# Patient Record
Sex: FEMALE | Race: OTHER | HISPANIC OR LATINO | URBAN - METROPOLITAN AREA
[De-identification: names, ages, dates, MRNs, and addresses within clinical notes are randomized per-mention and may not be internally consistent; named-entity substitution may affect disease eponyms.]

---

## 2018-03-05 ENCOUNTER — EMERGENCY (EMERGENCY)
Facility: HOSPITAL | Age: 38
LOS: 1 days | Discharge: HOME | End: 2018-03-05
Attending: EMERGENCY MEDICINE | Admitting: EMERGENCY MEDICINE
Payer: COMMERCIAL

## 2018-03-05 VITALS
TEMPERATURE: 98 F | SYSTOLIC BLOOD PRESSURE: 112 MMHG | OXYGEN SATURATION: 100 % | RESPIRATION RATE: 18 BRPM | HEART RATE: 71 BPM | DIASTOLIC BLOOD PRESSURE: 65 MMHG

## 2018-03-05 VITALS — HEART RATE: 70 BPM | RESPIRATION RATE: 16 BRPM | OXYGEN SATURATION: 100 %

## 2018-03-05 DIAGNOSIS — M21.611 BUNION OF RIGHT FOOT: Chronic | ICD-10-CM

## 2018-03-05 LAB — S PYO AG SPEC QL IA: NEGATIVE — SIGNIFICANT CHANGE UP

## 2018-03-05 PROCEDURE — 87081 CULTURE SCREEN ONLY: CPT

## 2018-03-05 PROCEDURE — 99282 EMERGENCY DEPT VISIT SF MDM: CPT

## 2018-03-05 PROCEDURE — 87880 STREP A ASSAY W/OPTIC: CPT

## 2018-03-05 PROCEDURE — 99283 EMERGENCY DEPT VISIT LOW MDM: CPT

## 2018-03-05 NOTE — ED ADULT NURSE NOTE - OBJECTIVE STATEMENT
Patient with sore throat and hoarse voice, but no resp distress. Started yesterday, not better yet today.

## 2018-03-05 NOTE — ED PROVIDER NOTE - OBJECTIVE STATEMENT
38yo female who presenst with sore throat for 2 days. pt c/o pain with swallowing, no cough, +chills, no fever, pt took motrin with relief, no sick contacts, no drooling or change in voice 38yo female who presents with sore throat for 2 days. pt c/o pain with swallowing, no cough, +chills, no fever, pt took motrin with relief, no sick contacts, no drooling or change in voice

## 2018-03-07 LAB
CULTURE RESULTS: SIGNIFICANT CHANGE UP
SPECIMEN SOURCE: SIGNIFICANT CHANGE UP

## 2018-05-03 NOTE — ED ADULT NURSE NOTE - NSSISCREENINGQ4_ED_A_ED
No Detail Level: Zone General Sunscreen Counseling: I recommended a broad spectrum sunscreen with a SPF of 30 or higher.  I explained that SPF 30 sunscreens block approximately 97 percent of the sun's harmful rays.  Sunscreens should be applied at least 15 minutes prior to expected sun exposure and then every 2 hours after that as long as sun exposure continues. If swimming or exercising sunscreen should be reapplied every 45 minutes to an hour after getting wet or sweating.  One ounce, or the equivalent of a shot glass full of sunscreen, is adequate to protect the skin not covered by a bathing suit. I also recommended a lip balm with a sunscreen as well. Sun protective clothing can be used in lieu of sunscreen but must be worn the entire time you are exposed to the sun's rays.

## 2020-01-15 NOTE — ED ADULT NURSE NOTE - CINV DISCH TEACH PARTICIP
Please inform patient that she was positive for GBS. As we discussed at her visit she will need IV antibiotics during labor.    Thanks,  Dionne Cruz CNM
Pre-charting complete. Patient has no orders needing to be placed.    Declines flu   Tdap done 12/26/19  
S: Reports fetal movement. Denies vaginal bleeding, abnormal vaginal discharge, or leaking of fluid. She has no complaints today.  O: see flow sheet  A:   35w4d ,size equals dates, GBS screening  P:  1. GBS vaginal/rectal culture collected today  2. OB check in 2 weeks  3. Reviewed danger signs and warning signs and when and how to contact CNM.         
Veronica is a 28 year old year old female here for an OB check.  She is      .  Gestational Age: 35w4d.  Concerns today include: None    She reports fetal movement.  She denies bleeding.  She denies cramping.  She denies nausea.  She denies breast tenderness.    No UA needed per Dionne Nancy, CNM    Allergy or Sensitivity to PCN? No    Patient was advised of today's exam.  GBS swab was set out for doctor.   If your visit includes an exam today, would you like an assistant to be present in the room during that time?No    [employer] application verified/signed up YES.   Pt informed that [employer] is loaded with  provider- approved content, customized resources, and weekly tasks and tips    (optimized scheduling) [employer] pledge card signed and more information about [employer] given to patient NO      
Patient

## 2020-11-02 ENCOUNTER — EMERGENCY (EMERGENCY)
Facility: HOSPITAL | Age: 40
LOS: 1 days | Discharge: HOME | End: 2020-11-02
Attending: EMERGENCY MEDICINE | Admitting: EMERGENCY MEDICINE
Payer: MEDICAID

## 2020-11-02 VITALS
HEART RATE: 70 BPM | SYSTOLIC BLOOD PRESSURE: 110 MMHG | RESPIRATION RATE: 16 BRPM | HEIGHT: 63 IN | TEMPERATURE: 97 F | DIASTOLIC BLOOD PRESSURE: 70 MMHG | OXYGEN SATURATION: 99 % | WEIGHT: 162.04 LBS

## 2020-11-02 DIAGNOSIS — M21.611 BUNION OF RIGHT FOOT: Chronic | ICD-10-CM

## 2020-11-02 PROCEDURE — 99283 EMERGENCY DEPT VISIT LOW MDM: CPT | Mod: 25

## 2020-11-02 PROCEDURE — 99283 EMERGENCY DEPT VISIT LOW MDM: CPT

## 2020-11-02 PROCEDURE — 96372 THER/PROPH/DIAG INJ SC/IM: CPT

## 2020-11-02 RX ORDER — CYCLOBENZAPRINE HYDROCHLORIDE 10 MG/1
1 TABLET, FILM COATED ORAL
Qty: 15 | Refills: 0
Start: 2020-11-02 | End: 2020-11-06

## 2020-11-02 RX ORDER — SERTRALINE 25 MG/1
75 TABLET, FILM COATED ORAL
Qty: 0 | Refills: 0 | DISCHARGE

## 2020-11-02 RX ORDER — KETOROLAC TROMETHAMINE 30 MG/ML
30 SYRINGE (ML) INJECTION ONCE
Refills: 0 | Status: DISCONTINUED | OUTPATIENT
Start: 2020-11-02 | End: 2020-11-02

## 2020-11-02 RX ORDER — CYCLOBENZAPRINE HYDROCHLORIDE 10 MG/1
10 TABLET, FILM COATED ORAL ONCE
Refills: 0 | Status: COMPLETED | OUTPATIENT
Start: 2020-11-02 | End: 2020-11-02

## 2020-11-02 RX ADMIN — CYCLOBENZAPRINE HYDROCHLORIDE 10 MILLIGRAM(S): 10 TABLET, FILM COATED ORAL at 21:47

## 2020-11-02 RX ADMIN — Medication 30 MILLIGRAM(S): at 21:48

## 2020-11-02 RX ADMIN — Medication 30 MILLIGRAM(S): at 22:21

## 2020-11-02 NOTE — ED PROVIDER NOTE - OBJECTIVE STATEMENT
39 yo female no pmhx c/o right lower back/buttock pain x 3 hours, radiating down her thigh, moderate pain, took motrin 400mg prior to arrival, worse with trunk twisting and standing.  States pain happened when trying to reach for something on the ground at home.  NO fever/chills.  NO urinary/bowel changes.

## 2020-11-02 NOTE — ED ADULT NURSE NOTE - NSIMPLEMENTINTERV_GEN_ALL_ED
Implemented All Universal Safety Interventions:  [place] to call system. Call bell, personal items and telephone within reach. Instruct patient to call for assistance. Room bathroom lighting operational. Non-slip footwear when patient is off stretcher. Physically safe environment: no spills, clutter or unnecessary equipment. Stretcher in lowest position, wheels locked, appropriate side rails in place.

## 2020-11-02 NOTE — ED ADULT NURSE NOTE - OBJECTIVE STATEMENT
Patient with history of anxiety (zoloft) presents via wheelchair from triage with c/o R lower back pain s/p playing with her daughter about three hours ago.  Took motrin 400mg to no relief.  Pain radiates down the R leg.  No history of sciatica or previous back issues.  No hematuria, numbness or tingling.  Difficulty bearing weight but states a little relief ambulating.

## 2020-11-02 NOTE — ED PROVIDER NOTE - NSFOLLOWUPINSTRUCTIONS_ED_ALL_ED_FT
1) Follow-up with your Primary Medical Doctor and Dr. Montelongo. Call today / next business day for prompt follow-up.  2) Return to Emergency room for any worsening or persistent pain, weakness, fever, or any other concerning symptoms.  3) See attached instruction sheets for additional information, including information regarding signs and symptoms to look out for, reasons to seek immediate care and other important instructions.  4) Take flexeril 10mg every 8 hours as needed.  Do not drive  5) Motrin 600mg every 6 hours as needed for pain.  Take with food.  6) Over the counter Salonpas 4% lidocaine patch once a day      WHAT YOU NEED TO KNOW:    Sciatica is a condition that causes pain along your sciatic nerve. The sciatic nerve runs from your spine through both sides of your buttocks. It then runs down the back of your thigh, into your lower leg and foot. Your sciatic nerve may be compressed, inflamed, irritated, or stretched.    DISCHARGE INSTRUCTIONS:    Medicines:   •NSAIDs: These medicines decrease swelling and pain. NSAIDs are available without a doctor's order. Ask your healthcare provider which medicine is right for you. Ask how much to take and when to take it. Take as directed. NSAIDs can cause stomach bleeding or kidney problems if not taken correctly.      •Acetaminophen: This medicine decreases pain. Acetaminophen is available without a doctor's order. Ask how much to take and when to take it. Follow directions. Acetaminophen can cause liver damage if not taken correctly.      •Muscle relaxers help decrease pain and muscle spasms.      •Take your medicine as directed. Contact your healthcare provider if you think your medicine is not helping or if you have side effects. Tell him of her if you are allergic to any medicine. Keep a list of the medicines, vitamins, and herbs you take. Include the amounts, and when and why you take them. Bring the list or the pill bottles to follow-up visits. Carry your medicine list with you in case of an emergency.      Follow up with your healthcare provider as directed: Write down your questions so you remember to ask them during your visits.     Manage your symptoms:   •Activity: Decrease your activity. Do not lift heavy objects or twist your back for at least 6 weeks. Slowly return to your usual activity.      •Ice: Ice helps decrease swelling and pain. Ice may also help prevent tissue damage. Use an ice pack, or put crushed ice in a plastic bag. Cover it with a towel and place it on your low back or leg for 15 to 20 minutes every hour or as directed.      •Heat: Heat helps decrease pain and muscle spasms. Apply heat on the area for 20 to 30 minutes every 2 hours for as many days as directed.       •Physical therapy: You may need to see physical therapist to teach you exercises to help improve movement and strength, and to decrease pain. An occupational therapist teaches you skills to help with your daily activities.       •Use assistive devices if directed: You may need to wear back support, such as a back brace. You may need crutches, a cane, or a walker to decrease stress on your lower back and leg muscles. Ask your healthcare provider for more information about assistive devices and how to use them correctly.      Self-care:   •Avoid pressure on your back and legs: Do not lift heavy objects, or stand or sit for long periods of time.      •Lift objects safely: Keep your back straight and bend your knees when you  an object. Do not bend or twist your back when you lift.      •Maintain a healthy weight: Ask your healthcare provider how much you should weigh. Ask him to help you create a weight loss plan if you are overweight.       •Exercise: Ask your healthcare provider about the best stretching, warmup, and exercise plan for you.       Contact your healthcare provider if:   •You have pain in your lower back at night or when resting.      •You have pain in your lower back with numbness below the knee.      •You have weakness in one leg only.      •You have questions or concerns about your condition or care.      Return to the emergency department if:   •You have trouble holding back your urine or bowel movements.      •You have weakness in both legs.      •You have numbness in your groin or buttocks.

## 2020-11-02 NOTE — ED PROVIDER NOTE - PHYSICAL EXAMINATION
Gen: Alert, NAD  Head/eyes: NC/AT, PERRL  ENT: airway patent  Neck: supple, no tenderness/meningismus/JVD, Trachea midline  Pulm/lung: Bilateral clear BS, normal resp effort, no wheeze/stridor/retractions  CV/heart: RRR, no M/R/G, +2 dist pulses (radial, pedal DP/PT, popliteal)  GI/Abd: soft, NT/ND, +BS, no guarding/rebound tenderness  Musculoskeletal: no edema/erythema/cyanosis, FROM in all extremities, no C/T/L spine ttp, +ttp right lower lumar paraspinal and right upper buttock, reproducible, no CVAT b/l  Skin: no rash, no vesicles, no petechaie, no ecchymosis, no swelling  Neuro: AAOx3, CN 2-12 intact, normal sensation, 5/5 motor strength in all extremities, normal gait

## 2020-11-02 NOTE — ED ADULT NURSE REASSESSMENT NOTE - NS ED NURSE REASSESS COMMENT FT1
Patient verbalizes improvement in pain level and was able to stand up independently with little pain.  Pending disposition from the ER.  Patient currently on her cell phone with  at bedside.  NAD.  Safety maintained.

## 2020-11-02 NOTE — ED PROVIDER NOTE - PATIENT PORTAL LINK FT
You can access the FollowMyHealth Patient Portal offered by [facility] by registering at the following website: [email or web address]. By joining [employer]’s FollowMyHealth portal, you will also be able to view your health information using other applications (apps) compatible with our system.

## 2020-11-03 PROBLEM — R42 DIZZINESS AND GIDDINESS: Chronic | Status: ACTIVE | Noted: 2018-03-05

## 2021-04-01 ENCOUNTER — RESULT REVIEW (OUTPATIENT)
Age: 41
End: 2021-04-01

## 2021-04-01 ENCOUNTER — APPOINTMENT (OUTPATIENT)
Dept: ULTRASOUND IMAGING | Facility: IMAGING CENTER | Age: 41
End: 2021-04-01
Payer: MEDICAID

## 2021-04-01 ENCOUNTER — OUTPATIENT (OUTPATIENT)
Dept: OUTPATIENT SERVICES | Facility: HOSPITAL | Age: 41
LOS: 1 days | End: 2021-04-01
Payer: MEDICAID

## 2021-04-01 DIAGNOSIS — M21.611 BUNION OF RIGHT FOOT: Chronic | ICD-10-CM

## 2021-04-01 DIAGNOSIS — R59.0 LOCALIZED ENLARGED LYMPH NODES: ICD-10-CM

## 2021-04-01 DIAGNOSIS — Z00.8 ENCOUNTER FOR OTHER GENERAL EXAMINATION: ICD-10-CM

## 2021-04-01 PROCEDURE — 88305 TISSUE EXAM BY PATHOLOGIST: CPT

## 2021-04-01 PROCEDURE — 10005 FNA BX W/US GDN 1ST LES: CPT

## 2021-04-01 PROCEDURE — 88189 FLOWCYTOMETRY/READ 16 & >: CPT

## 2021-04-01 PROCEDURE — 88305 TISSUE EXAM BY PATHOLOGIST: CPT | Mod: 26

## 2021-04-01 PROCEDURE — 88184 FLOWCYTOMETRY/ TC 1 MARKER: CPT

## 2021-04-01 PROCEDURE — 88173 CYTOPATH EVAL FNA REPORT: CPT | Mod: 26

## 2021-04-01 PROCEDURE — 88173 CYTOPATH EVAL FNA REPORT: CPT

## 2021-04-01 PROCEDURE — 87205 SMEAR GRAM STAIN: CPT

## 2021-04-01 PROCEDURE — 88185 FLOWCYTOMETRY/TC ADD-ON: CPT

## 2021-04-01 PROCEDURE — 88172 CYTP DX EVAL FNA 1ST EA SITE: CPT

## 2021-04-20 ENCOUNTER — LABORATORY RESULT (OUTPATIENT)
Age: 41
End: 2021-04-20

## 2021-04-20 ENCOUNTER — APPOINTMENT (OUTPATIENT)
Dept: SURGERY | Facility: CLINIC | Age: 41
End: 2021-04-20
Payer: MEDICAID

## 2021-04-20 VITALS
BODY MASS INDEX: 28.88 KG/M2 | HEIGHT: 63 IN | WEIGHT: 163 LBS | SYSTOLIC BLOOD PRESSURE: 109 MMHG | DIASTOLIC BLOOD PRESSURE: 70 MMHG | HEART RATE: 73 BPM

## 2021-04-20 DIAGNOSIS — Z78.9 OTHER SPECIFIED HEALTH STATUS: ICD-10-CM

## 2021-04-20 DIAGNOSIS — Z86.59 PERSONAL HISTORY OF OTHER MENTAL AND BEHAVIORAL DISORDERS: ICD-10-CM

## 2021-04-20 DIAGNOSIS — G40.909 EPILEPSY, UNSPECIFIED, NOT INTRACTABLE, W/OUT STATUS EPILEPTICUS: ICD-10-CM

## 2021-04-20 PROCEDURE — 99072 ADDL SUPL MATRL&STAF TM PHE: CPT

## 2021-04-20 PROCEDURE — 99204 OFFICE O/P NEW MOD 45 MIN: CPT | Mod: 25

## 2021-04-20 PROCEDURE — 36415 COLL VENOUS BLD VENIPUNCTURE: CPT

## 2021-04-20 RX ORDER — SERTRALINE HYDROCHLORIDE 25 MG/1
TABLET, FILM COATED ORAL
Refills: 0 | Status: ACTIVE | COMMUNITY

## 2021-04-20 NOTE — HISTORY OF PRESENT ILLNESS
[de-identified] : Pt c/o anterior neck mass .  had biopsies reported as benign.   had lymph node biopsied which shows metastatic papillary thyroid malignancy.   denies dysphagia, hoarseness, SOB or RT exposure\par Sonogram:   Isthmus 2.2 cm nodule, FNA (CBL) benign,  right  1.1 cm and  1.5 cm nodules, FNA (CBL) AUS;  left 1.3 cm nodule.  Right lateral neck 1.2 cm node,  FNA  Positive for metastatic papillary thyroid malignancy, Right level 1  1.0 cm, level 2   2.9 cm, Left level 3  1.6 and 1.2 cm, level 2  1.4 cm and 1.6 cm, level 5 Lymph node  1.3 cm\par normal TFTs\par I have reviewed all old and new data and available images.\par

## 2021-04-20 NOTE — ASSESSMENT
[FreeTextEntry1] : recommended total thyroidectomy with right modified radical neck dissection and postop MCKEON. risks, benefits and alternatives discussed at length.  I have discussed with the patient the anatomy of the area, the pathophysiology of the disease process and the rationale for surgery.  The attendant risks, possible complications and expected postoperative course have been discussed in detail.  I have given the patient the opportunity to ask questions, and all questions have been answered to the patient's satisfaction, and she wishes to proceed with the planned procedure.  to be scheduled at [facility]

## 2021-04-20 NOTE — PHYSICAL EXAM
[de-identified] : firm 1 cm right thyroid nodule, well circumscribed and mobile [Laryngoscopy Performed] : laryngoscopy was performed, see procedure section for findings [Midline] : located in midline position [Normal] : orientation to person, place, and time: normal [de-identified] : right neck node not palpable [de-identified] : indirect  laryngoscopy shows normal vocal cord mobility bilaterally with no lesions noted

## 2021-04-20 NOTE — CONSULT LETTER
[Dear  ___] : Dear  [unfilled], [Consult Letter:] : I had the pleasure of evaluating your patient, [unfilled]. [Please see my note below.] : Please see my note below. [Consult Closing:] : Thank you very much for allowing me to participate in the care of this patient.  If you have any questions, please do not hesitate to contact me. [Sincerely,] : Sincerely, [FreeTextEntry2] : Dr. Olegario Izaguirre, Dr. Nathalie Amoshikeenan [FreeTextEntry3] : Cecil Grider MD, FACS\par System Director, Endocrine Surgery\par [facility]\par Assistant Clinical Professor of Surgery\par [employer] [facility]  [DrZahida  ___] : Dr. VO

## 2021-04-21 LAB
T3 SERPL-MCNC: 87 NG/DL
T4 FREE SERPL-MCNC: 1 NG/DL
THYROGLOB AB SERPL-ACNC: 1897 IU/ML
THYROGLOB SERPL-MCNC: 29.3 NG/ML
TSH SERPL-ACNC: 3.05 UIU/ML

## 2021-04-23 ENCOUNTER — NON-APPOINTMENT (OUTPATIENT)
Age: 41
End: 2021-04-23

## 2021-04-26 ENCOUNTER — RESULT REVIEW (OUTPATIENT)
Age: 41
End: 2021-04-26

## 2021-05-01 ENCOUNTER — OUTPATIENT (OUTPATIENT)
Dept: OUTPATIENT SERVICES | Facility: HOSPITAL | Age: 41
LOS: 1 days | End: 2021-05-01
Payer: MEDICAID

## 2021-05-01 DIAGNOSIS — M21.611 BUNION OF RIGHT FOOT: Chronic | ICD-10-CM

## 2021-05-01 PROCEDURE — G9005: CPT

## 2021-05-12 ENCOUNTER — OUTPATIENT (OUTPATIENT)
Dept: OUTPATIENT SERVICES | Facility: HOSPITAL | Age: 41
LOS: 1 days | End: 2021-05-12

## 2021-05-12 VITALS
WEIGHT: 166.01 LBS | SYSTOLIC BLOOD PRESSURE: 124 MMHG | OXYGEN SATURATION: 98 % | HEART RATE: 74 BPM | TEMPERATURE: 99 F | DIASTOLIC BLOOD PRESSURE: 66 MMHG | RESPIRATION RATE: 16 BRPM | HEIGHT: 63 IN

## 2021-05-12 DIAGNOSIS — Z98.890 OTHER SPECIFIED POSTPROCEDURAL STATES: Chronic | ICD-10-CM

## 2021-05-12 DIAGNOSIS — M21.611 BUNION OF RIGHT FOOT: Chronic | ICD-10-CM

## 2021-05-12 DIAGNOSIS — C73 MALIGNANT NEOPLASM OF THYROID GLAND: ICD-10-CM

## 2021-05-12 LAB
ANION GAP SERPL CALC-SCNC: 14 MMOL/L — SIGNIFICANT CHANGE UP (ref 7–14)
BLD GP AB SCN SERPL QL: NEGATIVE — SIGNIFICANT CHANGE UP
BUN SERPL-MCNC: 18 MG/DL — SIGNIFICANT CHANGE UP (ref 7–23)
CALCIUM SERPL-MCNC: 8.9 MG/DL — SIGNIFICANT CHANGE UP (ref 8.4–10.5)
CHLORIDE SERPL-SCNC: 102 MMOL/L — SIGNIFICANT CHANGE UP (ref 98–107)
CO2 SERPL-SCNC: 20 MMOL/L — LOW (ref 22–31)
CREAT SERPL-MCNC: 0.79 MG/DL — SIGNIFICANT CHANGE UP (ref 0.5–1.3)
GLUCOSE SERPL-MCNC: 78 MG/DL — SIGNIFICANT CHANGE UP (ref 70–99)
HCG UR QL: NEGATIVE — SIGNIFICANT CHANGE UP
HCT VFR BLD CALC: 38.5 % — SIGNIFICANT CHANGE UP (ref 34.5–45)
HGB BLD-MCNC: 12.5 G/DL — SIGNIFICANT CHANGE UP (ref 11.5–15.5)
MCHC RBC-ENTMCNC: 27.7 PG — SIGNIFICANT CHANGE UP (ref 27–34)
MCHC RBC-ENTMCNC: 32.5 GM/DL — SIGNIFICANT CHANGE UP (ref 32–36)
MCV RBC AUTO: 85.4 FL — SIGNIFICANT CHANGE UP (ref 80–100)
NRBC # BLD: 0 /100 WBCS — SIGNIFICANT CHANGE UP
NRBC # FLD: 0 K/UL — SIGNIFICANT CHANGE UP
PLATELET # BLD AUTO: 237 K/UL — SIGNIFICANT CHANGE UP (ref 150–400)
POTASSIUM SERPL-MCNC: 4.1 MMOL/L — SIGNIFICANT CHANGE UP (ref 3.5–5.3)
POTASSIUM SERPL-SCNC: 4.1 MMOL/L — SIGNIFICANT CHANGE UP (ref 3.5–5.3)
RBC # BLD: 4.51 M/UL — SIGNIFICANT CHANGE UP (ref 3.8–5.2)
RBC # FLD: 13.2 % — SIGNIFICANT CHANGE UP (ref 10.3–14.5)
RH IG SCN BLD-IMP: POSITIVE — SIGNIFICANT CHANGE UP
SODIUM SERPL-SCNC: 136 MMOL/L — SIGNIFICANT CHANGE UP (ref 135–145)
WBC # BLD: 9.12 K/UL — SIGNIFICANT CHANGE UP (ref 3.8–10.5)
WBC # FLD AUTO: 9.12 K/UL — SIGNIFICANT CHANGE UP (ref 3.8–10.5)

## 2021-05-12 RX ORDER — SODIUM CHLORIDE 9 MG/ML
3 INJECTION INTRAMUSCULAR; INTRAVENOUS; SUBCUTANEOUS ONCE
Refills: 0 | Status: DISCONTINUED | OUTPATIENT
Start: 2021-05-24 | End: 2021-05-24

## 2021-05-12 RX ORDER — SERTRALINE 25 MG/1
1 TABLET, FILM COATED ORAL
Qty: 0 | Refills: 0 | DISCHARGE

## 2021-05-12 RX ORDER — SODIUM CHLORIDE 9 MG/ML
1000 INJECTION, SOLUTION INTRAVENOUS
Refills: 0 | Status: DISCONTINUED | OUTPATIENT
Start: 2021-05-24 | End: 2021-05-24

## 2021-05-12 NOTE — H&P PST ADULT - NSICDXPASTMEDICALHX_GEN_ALL_CORE_FT
PAST MEDICAL HISTORY:  Anxiety     Bilateral bunions     History of seizures last seizure in 2001, has been off medication for several years    Vertigo

## 2021-05-12 NOTE — H&P PST ADULT - NSICDXPROBLEM_GEN_ALL_CORE_FT
PROBLEM DIAGNOSES  Problem: Malignant neoplasm of thyroid gland  Assessment and Plan: Pt scheduled for surgery on 5/24/2021.  Pre-op instructions provided. Pt verbalized understanding.   Pepcid provided for GI prophylaxis.   Pt given detailed verbal and written instructions on chlorhexidine wash. Pt verbalized understanding with teachback.   Pt states she is already scheduled for preop COVID testing.   Dr Grider notified via email that pt is [facility] and does not want blood transfusion.

## 2021-05-12 NOTE — H&P PST ADULT - HISTORY OF PRESENT ILLNESS
40 year old female with mass to neck which she first noticed around 11/2020. Pt states she had biopsy which revealed malignancy. Pt presents today for presurgical evaluation for ... 40 year old female with mass to neck which she first noticed around 11/2020. Pt states she had biopsy which revealed malignancy. Pt presents today for presurgical evaluation for Total Thyroidectomy with Right Neck Dissection.

## 2021-05-20 DIAGNOSIS — Z01.818 ENCOUNTER FOR OTHER PREPROCEDURAL EXAMINATION: ICD-10-CM

## 2021-05-21 ENCOUNTER — APPOINTMENT (OUTPATIENT)
Dept: DISASTER EMERGENCY | Facility: CLINIC | Age: 41
End: 2021-05-21

## 2021-05-21 PROBLEM — M21.611 BUNION OF RIGHT FOOT: Chronic | Status: ACTIVE | Noted: 2021-05-12

## 2021-05-21 PROBLEM — Z87.898 PERSONAL HISTORY OF OTHER SPECIFIED CONDITIONS: Chronic | Status: ACTIVE | Noted: 2021-05-12

## 2021-05-21 PROBLEM — F41.9 ANXIETY DISORDER, UNSPECIFIED: Chronic | Status: ACTIVE | Noted: 2021-05-12

## 2021-05-22 LAB — SARS-COV-2 N GENE NPH QL NAA+PROBE: NOT DETECTED

## 2021-05-22 NOTE — ASU PATIENT PROFILE, ADULT - PMH
Anxiety    Bilateral bunions    History of seizures  last seizure in 2001, has been off medication for several years  Vertigo

## 2021-05-23 ENCOUNTER — TRANSCRIPTION ENCOUNTER (OUTPATIENT)
Age: 41
End: 2021-05-23

## 2021-05-24 ENCOUNTER — INPATIENT (INPATIENT)
Facility: HOSPITAL | Age: 41
LOS: 0 days | Discharge: HOME | End: 2021-05-25
Attending: SPECIALIST | Admitting: SPECIALIST
Payer: MEDICAID

## 2021-05-24 ENCOUNTER — APPOINTMENT (OUTPATIENT)
Dept: SURGERY | Facility: HOSPITAL | Age: 41
End: 2021-05-24

## 2021-05-24 ENCOUNTER — RESULT REVIEW (OUTPATIENT)
Age: 41
End: 2021-05-24

## 2021-05-24 ENCOUNTER — TRANSCRIPTION ENCOUNTER (OUTPATIENT)
Age: 41
End: 2021-05-24

## 2021-05-24 VITALS
WEIGHT: 166.01 LBS | TEMPERATURE: 98 F | HEART RATE: 59 BPM | HEIGHT: 63 IN | OXYGEN SATURATION: 100 % | SYSTOLIC BLOOD PRESSURE: 112 MMHG | RESPIRATION RATE: 16 BRPM | DIASTOLIC BLOOD PRESSURE: 74 MMHG

## 2021-05-24 DIAGNOSIS — C73 MALIGNANT NEOPLASM OF THYROID GLAND: ICD-10-CM

## 2021-05-24 DIAGNOSIS — Z98.890 OTHER SPECIFIED POSTPROCEDURAL STATES: Chronic | ICD-10-CM

## 2021-05-24 LAB
CALCIUM SERPL-MCNC: 8.5 MG/DL — SIGNIFICANT CHANGE UP (ref 8.4–10.5)
HCG UR QL: NEGATIVE — SIGNIFICANT CHANGE UP

## 2021-05-24 PROCEDURE — 88309 TISSUE EXAM BY PATHOLOGIST: CPT | Mod: 26

## 2021-05-24 PROCEDURE — 88307 TISSUE EXAM BY PATHOLOGIST: CPT | Mod: 26

## 2021-05-24 RX ORDER — SERTRALINE 25 MG/1
1 TABLET, FILM COATED ORAL
Qty: 0 | Refills: 0 | DISCHARGE

## 2021-05-24 RX ORDER — ACETAMINOPHEN 500 MG
2 TABLET ORAL
Qty: 0 | Refills: 0 | DISCHARGE
Start: 2021-05-24

## 2021-05-24 RX ORDER — CALCIUM CARBONATE 500(1250)
1 TABLET ORAL
Qty: 0 | Refills: 0 | DISCHARGE
Start: 2021-05-24

## 2021-05-24 RX ORDER — LEVOTHYROXINE SODIUM 125 MCG
1 TABLET ORAL
Qty: 0 | Refills: 0 | DISCHARGE
Start: 2021-05-24

## 2021-05-24 RX ORDER — ONDANSETRON 8 MG/1
4 TABLET, FILM COATED ORAL ONCE
Refills: 0 | Status: DISCONTINUED | OUTPATIENT
Start: 2021-05-24 | End: 2021-05-24

## 2021-05-24 RX ORDER — SERTRALINE 25 MG/1
100 TABLET, FILM COATED ORAL DAILY
Refills: 0 | Status: DISCONTINUED | OUTPATIENT
Start: 2021-05-24 | End: 2021-05-25

## 2021-05-24 RX ORDER — CALCIUM CARBONATE 500(1250)
1 TABLET ORAL EVERY 6 HOURS
Refills: 0 | Status: DISCONTINUED | OUTPATIENT
Start: 2021-05-24 | End: 2021-05-25

## 2021-05-24 RX ORDER — FENTANYL CITRATE 50 UG/ML
25 INJECTION INTRAVENOUS
Refills: 0 | Status: DISCONTINUED | OUTPATIENT
Start: 2021-05-24 | End: 2021-05-24

## 2021-05-24 RX ORDER — OXYCODONE HYDROCHLORIDE 5 MG/1
10 TABLET ORAL ONCE
Refills: 0 | Status: DISCONTINUED | OUTPATIENT
Start: 2021-05-24 | End: 2021-05-24

## 2021-05-24 RX ORDER — SODIUM CHLORIDE 9 MG/ML
1000 INJECTION, SOLUTION INTRAVENOUS
Refills: 0 | Status: DISCONTINUED | OUTPATIENT
Start: 2021-05-24 | End: 2021-05-25

## 2021-05-24 RX ORDER — CALCIUM GLUCONATE 100 MG/ML
1 VIAL (ML) INTRAVENOUS ONCE
Refills: 0 | Status: COMPLETED | OUTPATIENT
Start: 2021-05-24 | End: 2021-05-24

## 2021-05-24 RX ORDER — OXYCODONE AND ACETAMINOPHEN 5; 325 MG/1; MG/1
1 TABLET ORAL EVERY 6 HOURS
Refills: 0 | Status: DISCONTINUED | OUTPATIENT
Start: 2021-05-24 | End: 2021-05-25

## 2021-05-24 RX ORDER — ACETAMINOPHEN 500 MG
650 TABLET ORAL EVERY 6 HOURS
Refills: 0 | Status: DISCONTINUED | OUTPATIENT
Start: 2021-05-24 | End: 2021-05-25

## 2021-05-24 RX ORDER — LEVOTHYROXINE SODIUM 125 MCG
100 TABLET ORAL DAILY
Refills: 0 | Status: DISCONTINUED | OUTPATIENT
Start: 2021-05-24 | End: 2021-05-25

## 2021-05-24 RX ORDER — LEVOTHYROXINE SODIUM 125 MCG
1 TABLET ORAL
Qty: 30 | Refills: 2
Start: 2021-05-24 | End: 2021-08-21

## 2021-05-24 RX ORDER — FENTANYL CITRATE 50 UG/ML
50 INJECTION INTRAVENOUS ONCE
Refills: 0 | Status: DISCONTINUED | OUTPATIENT
Start: 2021-05-24 | End: 2021-05-24

## 2021-05-24 RX ORDER — BENZOCAINE AND MENTHOL 5; 1 G/100ML; G/100ML
1 LIQUID ORAL
Refills: 0 | Status: DISCONTINUED | OUTPATIENT
Start: 2021-05-24 | End: 2021-05-25

## 2021-05-24 RX ORDER — OXYCODONE HYDROCHLORIDE 5 MG/1
5 TABLET ORAL ONCE
Refills: 0 | Status: DISCONTINUED | OUTPATIENT
Start: 2021-05-24 | End: 2021-05-24

## 2021-05-24 RX ADMIN — FENTANYL CITRATE 25 MICROGRAM(S): 50 INJECTION INTRAVENOUS at 21:05

## 2021-05-24 RX ADMIN — FENTANYL CITRATE 50 MICROGRAM(S): 50 INJECTION INTRAVENOUS at 18:10

## 2021-05-24 RX ADMIN — Medication 100 GRAM(S): at 17:45

## 2021-05-24 RX ADMIN — Medication 1 TABLET(S): at 19:05

## 2021-05-24 RX ADMIN — FENTANYL CITRATE 50 MICROGRAM(S): 50 INJECTION INTRAVENOUS at 18:20

## 2021-05-24 RX ADMIN — FENTANYL CITRATE 25 MICROGRAM(S): 50 INJECTION INTRAVENOUS at 21:20

## 2021-05-24 RX ADMIN — Medication 1 TABLET(S): at 23:54

## 2021-05-24 NOTE — DISCHARGE NOTE PROVIDER - NSDCCPTREATMENT_GEN_ALL_CORE_FT
PRINCIPAL PROCEDURE  Procedure: Total thyroidectomy with radical neck dissection  Findings and Treatment:       SECONDARY PROCEDURE  Procedure: Parathyroid autotransplantation  Findings and Treatment:

## 2021-05-24 NOTE — DISCHARGE NOTE PROVIDER - CARE PROVIDER_API CALL
Cecil Grider)  Plastic Surgery  [address] [place] [address] [address]  [place] [address]  Phone: [phone]  Fax: [phone]  Follow Up Time:

## 2021-05-24 NOTE — DISCHARGE NOTE PROVIDER - HOSPITAL COURSE
05/24/2021 - total thyroidectomy, right modified radical neck dissection, parathyroid autotransplantation

## 2021-05-24 NOTE — DISCHARGE NOTE PROVIDER - NSDCCPCAREPLAN_GEN_ALL_CORE_FT
PRINCIPAL DISCHARGE DIAGNOSIS  Diagnosis: Thyroid malignant neoplasm  Assessment and Plan of Treatment:

## 2021-05-24 NOTE — CHART NOTE - NSCHARTNOTEFT_GEN_A_CORE
PROCEDURE: Total thyroidectomy w right modified radical neck dissection, parathyroid autoimplantation    SUBJECTIVE:   Pt seen and examined at bedside. Pt s/p Total thyroidectomy w right modified radical neck dissection, parathyroid autoimplantation. Pt tolerated the procedure well. Pt laying in bed comfortably. No nausea, vomiting, chest pain, SOB, fever, chills.        Vital Signs Last 24 Hrs  T(C): 36.8 (24 May 2021 17:40), Max: 36.8 (24 May 2021 17:40)  T(F): 98.2 (24 May 2021 17:40), Max: 98.2 (24 May 2021 17:40)  HR: 71 (24 May 2021 20:00) (59 - 75)  BP: 123/74 (24 May 2021 20:00) (105/86 - 127/73)  BP(mean): 85 (24 May 2021 20:00) (81 - 90)  RR: 19 (24 May 2021 20:00) (14 - 19)  SpO2: 94% (24 May 2021 20:00) (94% - 100%)      PHYSICAL EXAM:  Constitutional: NAD, laying in bed  Neuro: AAOx3  Respiratory: breathing comfortably  HEENT: neck soft, no hematoma, dressing c/d/i, KEITH w/ SS      I&O's Summary    24 May 2021 07:01  -  24 May 2021 21:59  --------------------------------------------------------  IN: 150 mL / OUT: 25 mL / NET: 125 mL      I&O's Detail    24 May 2021 07:01  -  24 May 2021 21:59  --------------------------------------------------------  IN:    Lactated Ringers: 150 mL  Total IN: 150 mL    OUT:    Bulb (mL): 25 mL  Total OUT: 25 mL    Total NET: 125 mL          MEDICATIONS  (STANDING):  calcium carbonate    500 mG (Tums) Chewable 1 Tablet(s) Chew every 6 hours  lactated ringers. 1000 milliLiter(s) (50 mL/Hr) IV Continuous <Continuous>  levothyroxine 100 MICROGram(s) Oral daily  sertraline 100 milliGRAM(s) Oral daily    MEDICATIONS  (PRN):  acetaminophen   Tablet .. 650 milliGRAM(s) Oral every 6 hours PRN Moderate Pain (4 - 6)  benzocaine 15 mG/menthol 3.6 mG (Sugar-Free) Lozenge 1 Lozenge Oral every 2 hours PRN Sore Throat  fentaNYL    Injectable 25 MICROGram(s) IV Push every 5 minutes PRN Moderate Pain (4 - 6)  ondansetron Injectable 4 milliGRAM(s) IV Push once PRN Nausea and/or Vomiting  oxycodone    5 mG/acetaminophen 325 mG 1 Tablet(s) Oral every 6 hours PRN Severe Pain (7 - 10)  oxyCODONE    IR 5 milliGRAM(s) Oral once PRN Moderate Pain (4 - 6)  oxyCODONE    IR 10 milliGRAM(s) Oral once PRN Severe Pain (7 - 10)      LABS:              Pt is a 39yo F s/p Total thyroidectomy w right modified radical neck dissection, parathyroid autoimplantation    Plan:  - regular diet, as tolerated  - pain control  - f/u Ca levels  - no AM labs  - no DVT ppx  - f/u KEITH output, dc in AM  - dc tomorrow    C Team Surgery, [id number]

## 2021-05-24 NOTE — BRIEF OPERATIVE NOTE - NSICDXBRIEFPROCEDURE_GEN_ALL_CORE_FT
PROCEDURES:  Total thyroidectomy with modified radical neck dissection 24-May-2021 17:44:16  Kostas Connolly

## 2021-05-24 NOTE — DISCHARGE NOTE PROVIDER - NSDCMRMEDTOKEN_GEN_ALL_CORE_FT
acetaminophen 325 mg oral tablet: 2 tab(s) orally every 6 hours, As needed, Moderate Pain (4 - 6)  calcium carbonate 500 mg (200 mg elemental calcium) oral tablet, chewable: 1 tab(s) orally every 6 hours  levothyroxine 100 mcg (0.1 mg) oral tablet: 1 tab(s) orally once a day  sertraline 100 mg oral tablet: 1 tab(s) orally once a day   acetaminophen 325 mg oral tablet: 2 tab(s) orally every 6 hours, As needed, Moderate Pain (4 - 6)  calcium carbonate 500 mg (200 mg elemental calcium) oral tablet, chewable: 1 tab(s) orally every 6 hours  Levoxyl 100 mcg (0.1 mg) oral tablet: 1 tab(s) orally once a day   sertraline 100 mg oral tablet: 1 tab(s) orally once a day

## 2021-05-25 ENCOUNTER — TRANSCRIPTION ENCOUNTER (OUTPATIENT)
Age: 41
End: 2021-05-25

## 2021-05-25 VITALS
RESPIRATION RATE: 18 BRPM | HEART RATE: 86 BPM | TEMPERATURE: 98 F | DIASTOLIC BLOOD PRESSURE: 64 MMHG | SYSTOLIC BLOOD PRESSURE: 109 MMHG | OXYGEN SATURATION: 99 %

## 2021-05-25 RX ADMIN — OXYCODONE AND ACETAMINOPHEN 1 TABLET(S): 5; 325 TABLET ORAL at 00:22

## 2021-05-25 RX ADMIN — OXYCODONE AND ACETAMINOPHEN 1 TABLET(S): 5; 325 TABLET ORAL at 13:02

## 2021-05-25 RX ADMIN — Medication 1 TABLET(S): at 06:21

## 2021-05-25 RX ADMIN — OXYCODONE AND ACETAMINOPHEN 1 TABLET(S): 5; 325 TABLET ORAL at 12:32

## 2021-05-25 RX ADMIN — SERTRALINE 100 MILLIGRAM(S): 25 TABLET, FILM COATED ORAL at 11:26

## 2021-05-25 RX ADMIN — Medication 100 MICROGRAM(S): at 06:21

## 2021-05-25 RX ADMIN — Medication 1 TABLET(S): at 11:26

## 2021-05-25 RX ADMIN — OXYCODONE AND ACETAMINOPHEN 1 TABLET(S): 5; 325 TABLET ORAL at 06:32

## 2021-05-25 RX ADMIN — OXYCODONE AND ACETAMINOPHEN 1 TABLET(S): 5; 325 TABLET ORAL at 00:52

## 2021-05-25 RX ADMIN — OXYCODONE AND ACETAMINOPHEN 1 TABLET(S): 5; 325 TABLET ORAL at 07:02

## 2021-05-25 NOTE — DISCHARGE NOTE NURSING/CASE MANAGEMENT/SOCIAL WORK - PATIENT PORTAL LINK FT
You can access the FollowMyHealth Patient Portal offered by [facility] by registering at the following website: [email or web address]. By joining [employer]’s FollowMyHealth portal, you will also be able to view your health information using other applications (apps) compatible with our system.

## 2021-05-25 NOTE — PROGRESS NOTE ADULT - SUBJECTIVE AND OBJECTIVE BOX
SUBJECTIVE:   Pt seen and examined at bedside. No acute events overnight. Pt laying in bed comfortably. No nausea, vomiting, chest pain, SOB, fever, chills.        Vital Signs Last 24 Hrs  T(C): 36.6 (24 May 2021 23:35), Max: 36.8 (24 May 2021 17:40)  T(F): 97.8 (24 May 2021 23:35), Max: 98.2 (24 May 2021 17:40)  HR: 66 (24 May 2021 23:35) (59 - 82)  BP: 113/65 (24 May 2021 23:35) (105/86 - 127/73)  BP(mean): 74 (24 May 2021 23:00) (73 - 90)  RR: 18 (24 May 2021 23:35) (14 - 21)  SpO2: 97% (24 May 2021 23:35) (93% - 100%)      PHYSICAL EXAM:  Constitutional: NAD, laying in bed  Neuro: AAOx3  Respiratory: breathing comfortably  HEENT: neck soft, no hematoma, dressing c/d/i, KEITH w/ SS      I&O's Summary    24 May 2021 07:01  -  25 May 2021 00:31  --------------------------------------------------------  IN: 540 mL / OUT: 440 mL / NET: 100 mL      I&O's Detail    24 May 2021 07:01  -  25 May 2021 00:31  --------------------------------------------------------  IN:    Lactated Ringers: 300 mL    Oral Fluid: 240 mL  Total IN: 540 mL    OUT:    Bulb (mL): 40 mL    IV PiggyBack: 0 mL    Voided (mL): 400 mL  Total OUT: 440 mL    Total NET: 100 mL          MEDICATIONS  (STANDING):  calcium carbonate    500 mG (Tums) Chewable 1 Tablet(s) Chew every 6 hours  lactated ringers. 1000 milliLiter(s) (50 mL/Hr) IV Continuous <Continuous>  levothyroxine 100 MICROGram(s) Oral daily  sertraline 100 milliGRAM(s) Oral daily    MEDICATIONS  (PRN):  acetaminophen   Tablet .. 650 milliGRAM(s) Oral every 6 hours PRN Moderate Pain (4 - 6)  benzocaine 15 mG/menthol 3.6 mG (Sugar-Free) Lozenge 1 Lozenge Oral every 2 hours PRN Sore Throat  oxycodone    5 mG/acetaminophen 325 mG 1 Tablet(s) Oral every 6 hours PRN Severe Pain (7 - 10)      LABS:      Ca    8.5      24 May 2021 22:17            RADIOLOGY & ADDITIONAL STUDIES:
1 day s/p total thyroidectomy and neck dissection. afebrile. calcium level normal. negative chvostek's sign.  no collections.  discharge home with KEITH . f/u in office

## 2021-05-25 NOTE — PATIENT PROFILE ADULT - HAVE YOU EXPERIENCED VIOLENCE OR A TRAUMATIC EVENT?
LESTER PRATT  : 1950  04/10/18 09:41:26  ACCOUNT:  [id number]  HOME PHONE:  [phone]  WORK PHONE:     See previous emr entry. Discussed with Dr Piper and he will not write letter pt requested as there's no cardiac justification. Meanwhile, pt left another message reporting PCP Dr Snyder will write note. Marvin ALICEA         no

## 2021-05-25 NOTE — PROGRESS NOTE ADULT - ASSESSMENT
Pt is a 39yo F POD1 s/p Total thyroidectomy w right modified radical neck dissection, parathyroid autoimplantation    Plan:  - regular diet, as tolerated  - pain control  - f/u KEITH output, dc today  - dc home    C Team Surgery, [id number]

## 2021-05-27 DIAGNOSIS — Z71.89 OTHER SPECIFIED COUNSELING: ICD-10-CM

## 2021-05-27 LAB — SURGICAL PATHOLOGY STUDY: SIGNIFICANT CHANGE UP

## 2021-06-01 ENCOUNTER — APPOINTMENT (OUTPATIENT)
Dept: SURGERY | Facility: CLINIC | Age: 41
End: 2021-06-01
Payer: MEDICAID

## 2021-06-01 PROCEDURE — 99024 POSTOP FOLLOW-UP VISIT: CPT

## 2021-06-01 RX ORDER — CRISABOROLE 20 MG/G
2 OINTMENT TOPICAL
Qty: 60 | Refills: 0 | Status: ACTIVE | COMMUNITY
Start: 2020-12-22

## 2021-06-01 RX ORDER — SERTRALINE HYDROCHLORIDE 100 MG/1
100 TABLET, FILM COATED ORAL
Qty: 30 | Refills: 0 | Status: ACTIVE | COMMUNITY
Start: 2021-05-21

## 2021-06-01 NOTE — ASSESSMENT
[FreeTextEntry1] : s/p Thyroidectomy and Right neck dissection\par daily care\par drain removed\par Path reviewed\par Thyrogen scan and MCKEON\par f/u 4 weeks

## 2021-06-01 NOTE — HISTORY OF PRESENT ILLNESS
[de-identified] : Pt 1 week s/p total thyroidectomy and right neck dissection doing well without complaints drain less than 30 cc

## 2021-07-01 ENCOUNTER — LABORATORY RESULT (OUTPATIENT)
Age: 41
End: 2021-07-01

## 2021-07-01 ENCOUNTER — APPOINTMENT (OUTPATIENT)
Dept: SURGERY | Facility: CLINIC | Age: 41
End: 2021-07-01
Payer: COMMERCIAL

## 2021-07-01 PROCEDURE — 99024 POSTOP FOLLOW-UP VISIT: CPT

## 2021-07-01 NOTE — PHYSICAL EXAM
[de-identified] : well healed scar [Midline] : located in midline position [Normal] : orientation to person, place, and time: normal

## 2021-07-01 NOTE — ASSESSMENT
[FreeTextEntry1] : s/p Thyroidectomy and Right neck dissection\par daily care\par blood work\par f/u 3 months

## 2021-07-02 ENCOUNTER — NON-APPOINTMENT (OUTPATIENT)
Age: 41
End: 2021-07-02

## 2021-07-02 LAB
T3 SERPL-MCNC: 62 NG/DL
T4 FREE SERPL-MCNC: 0.9 NG/DL
THYROGLOB AB SERPL-ACNC: 1021 IU/ML
THYROGLOB SERPL-MCNC: <0.2 NG/ML
TSH SERPL-ACNC: 12.4 UIU/ML

## 2021-07-12 ENCOUNTER — APPOINTMENT (OUTPATIENT)
Dept: NUCLEAR MEDICINE | Facility: HOSPITAL | Age: 41
End: 2021-07-12
Payer: COMMERCIAL

## 2021-07-12 ENCOUNTER — OUTPATIENT (OUTPATIENT)
Dept: OUTPATIENT SERVICES | Facility: HOSPITAL | Age: 41
LOS: 1 days | End: 2021-07-12
Payer: MEDICAID

## 2021-07-12 DIAGNOSIS — C77.0 SECONDARY AND UNSPECIFIED MALIGNANT NEOPLASM OF LYMPH NODES OF HEAD, FACE AND NECK: ICD-10-CM

## 2021-07-12 DIAGNOSIS — Z98.890 OTHER SPECIFIED POSTPROCEDURAL STATES: Chronic | ICD-10-CM

## 2021-07-12 PROCEDURE — 99202 OFFICE O/P NEW SF 15 MIN: CPT

## 2021-07-13 ENCOUNTER — APPOINTMENT (OUTPATIENT)
Dept: NUCLEAR MEDICINE | Facility: HOSPITAL | Age: 41
End: 2021-07-13

## 2021-07-14 ENCOUNTER — APPOINTMENT (OUTPATIENT)
Dept: NUCLEAR MEDICINE | Facility: HOSPITAL | Age: 41
End: 2021-07-14

## 2021-07-16 ENCOUNTER — APPOINTMENT (OUTPATIENT)
Dept: NUCLEAR MEDICINE | Facility: HOSPITAL | Age: 41
End: 2021-07-16
Payer: COMMERCIAL

## 2021-07-16 PROCEDURE — 78830 RP LOCLZJ TUM SPECT W/CT 1: CPT

## 2021-07-16 PROCEDURE — 78020 THYROID MET UPTAKE: CPT | Mod: 26

## 2021-07-16 PROCEDURE — 78020 THYROID MET UPTAKE: CPT

## 2021-07-16 PROCEDURE — 78018 THYROID MET IMAGING BODY: CPT

## 2021-07-16 PROCEDURE — 96372 THER/PROPH/DIAG INJ SC/IM: CPT

## 2021-07-16 PROCEDURE — 84702 CHORIONIC GONADOTROPIN TEST: CPT

## 2021-07-16 PROCEDURE — 78830 RP LOCLZJ TUM SPECT W/CT 1: CPT | Mod: 26

## 2021-07-16 PROCEDURE — A9528: CPT

## 2021-07-16 PROCEDURE — 78018 THYROID MET IMAGING BODY: CPT | Mod: 26

## 2021-07-19 ENCOUNTER — NON-APPOINTMENT (OUTPATIENT)
Age: 41
End: 2021-07-19

## 2021-07-21 ENCOUNTER — OUTPATIENT (OUTPATIENT)
Dept: OUTPATIENT SERVICES | Facility: HOSPITAL | Age: 41
LOS: 1 days | End: 2021-07-21
Payer: MEDICAID

## 2021-07-21 ENCOUNTER — APPOINTMENT (OUTPATIENT)
Dept: NUCLEAR MEDICINE | Facility: HOSPITAL | Age: 41
End: 2021-07-21
Payer: COMMERCIAL

## 2021-07-21 DIAGNOSIS — Z98.890 OTHER SPECIFIED POSTPROCEDURAL STATES: Chronic | ICD-10-CM

## 2021-07-21 DIAGNOSIS — C73 MALIGNANT NEOPLASM OF THYROID GLAND: ICD-10-CM

## 2021-07-21 LAB — HCG SERPL-ACNC: <2 MIU/ML — SIGNIFICANT CHANGE UP

## 2021-07-22 ENCOUNTER — APPOINTMENT (OUTPATIENT)
Dept: NUCLEAR MEDICINE | Facility: HOSPITAL | Age: 41
End: 2021-07-22

## 2021-07-23 ENCOUNTER — APPOINTMENT (OUTPATIENT)
Dept: NUCLEAR MEDICINE | Facility: HOSPITAL | Age: 41
End: 2021-07-23

## 2021-07-23 ENCOUNTER — RESULT REVIEW (OUTPATIENT)
Age: 41
End: 2021-07-23

## 2021-07-23 PROCEDURE — 79005 NUCLEAR RX ORAL ADMIN: CPT | Mod: 26

## 2021-07-29 ENCOUNTER — APPOINTMENT (OUTPATIENT)
Dept: NUCLEAR MEDICINE | Facility: HOSPITAL | Age: 41
End: 2021-07-29

## 2021-07-29 ENCOUNTER — RESULT REVIEW (OUTPATIENT)
Age: 41
End: 2021-07-29

## 2021-07-29 PROCEDURE — 78018 THYROID MET IMAGING BODY: CPT | Mod: 26

## 2021-07-29 PROCEDURE — A9517: CPT

## 2021-07-29 PROCEDURE — 96372 THER/PROPH/DIAG INJ SC/IM: CPT

## 2021-07-29 PROCEDURE — 84702 CHORIONIC GONADOTROPIN TEST: CPT

## 2021-07-29 PROCEDURE — 79005 NUCLEAR RX ORAL ADMIN: CPT

## 2021-07-29 PROCEDURE — 78018 THYROID MET IMAGING BODY: CPT

## 2021-08-02 ENCOUNTER — NON-APPOINTMENT (OUTPATIENT)
Age: 41
End: 2021-08-02

## 2021-09-02 ENCOUNTER — LABORATORY RESULT (OUTPATIENT)
Age: 41
End: 2021-09-02

## 2021-09-07 ENCOUNTER — NON-APPOINTMENT (OUTPATIENT)
Age: 41
End: 2021-09-07

## 2021-09-07 LAB
T3 SERPL-MCNC: 67 NG/DL
T4 FREE SERPL-MCNC: 1.3 NG/DL
THYROGLOB AB SERPL-ACNC: 1080 IU/ML
THYROGLOB SERPL-MCNC: <0.2 NG/ML
TSH SERPL-ACNC: 16.1 UIU/ML

## 2021-09-07 RX ORDER — LEVOTHYROXINE SODIUM 0.15 MG/1
150 TABLET ORAL
Qty: 90 | Refills: 0 | Status: ACTIVE | COMMUNITY
Start: 2021-09-07 | End: 1900-01-01

## 2021-09-07 RX ORDER — LEVOTHYROXINE SODIUM 0.12 MG/1
125 TABLET ORAL
Qty: 90 | Refills: 1 | Status: COMPLETED | COMMUNITY
Start: 2021-07-02 | End: 2021-09-07

## 2021-09-07 RX ORDER — LEVOTHYROXINE SODIUM 0.1 MG/1
100 TABLET ORAL
Qty: 30 | Refills: 0 | Status: COMPLETED | COMMUNITY
Start: 2021-05-24 | End: 2021-09-07

## 2021-10-08 ENCOUNTER — EMERGENCY (EMERGENCY)
Facility: HOSPITAL | Age: 41
LOS: 1 days | Discharge: HOME | End: 2021-10-08
Attending: EMERGENCY MEDICINE | Admitting: EMERGENCY MEDICINE
Payer: MEDICAID

## 2021-10-08 VITALS
RESPIRATION RATE: 16 BRPM | SYSTOLIC BLOOD PRESSURE: 124 MMHG | TEMPERATURE: 98 F | OXYGEN SATURATION: 100 % | HEART RATE: 61 BPM | DIASTOLIC BLOOD PRESSURE: 61 MMHG

## 2021-10-08 VITALS
DIASTOLIC BLOOD PRESSURE: 64 MMHG | HEIGHT: 63 IN | OXYGEN SATURATION: 100 % | RESPIRATION RATE: 12 BRPM | SYSTOLIC BLOOD PRESSURE: 105 MMHG | HEART RATE: 68 BPM | TEMPERATURE: 98 F

## 2021-10-08 DIAGNOSIS — Z98.890 OTHER SPECIFIED POSTPROCEDURAL STATES: Chronic | ICD-10-CM

## 2021-10-08 LAB
ALBUMIN SERPL ELPH-MCNC: 4.1 G/DL — SIGNIFICANT CHANGE UP (ref 3.3–5)
ALP SERPL-CCNC: 86 U/L — SIGNIFICANT CHANGE UP (ref 40–120)
ALT FLD-CCNC: 15 U/L — SIGNIFICANT CHANGE UP (ref 4–33)
ANION GAP SERPL CALC-SCNC: 13 MMOL/L — SIGNIFICANT CHANGE UP (ref 7–14)
APPEARANCE UR: ABNORMAL
AST SERPL-CCNC: 21 U/L — SIGNIFICANT CHANGE UP (ref 4–32)
BASOPHILS # BLD AUTO: 0.02 K/UL — SIGNIFICANT CHANGE UP (ref 0–0.2)
BASOPHILS NFR BLD AUTO: 0.3 % — SIGNIFICANT CHANGE UP (ref 0–2)
BILIRUB SERPL-MCNC: <0.2 MG/DL — SIGNIFICANT CHANGE UP (ref 0.2–1.2)
BILIRUB UR-MCNC: NEGATIVE — SIGNIFICANT CHANGE UP
BUN SERPL-MCNC: 11 MG/DL — SIGNIFICANT CHANGE UP (ref 7–23)
CALCIUM SERPL-MCNC: 9.3 MG/DL — SIGNIFICANT CHANGE UP (ref 8.4–10.5)
CHLORIDE SERPL-SCNC: 100 MMOL/L — SIGNIFICANT CHANGE UP (ref 98–107)
CO2 SERPL-SCNC: 21 MMOL/L — LOW (ref 22–31)
COLOR SPEC: YELLOW — SIGNIFICANT CHANGE UP
CREAT SERPL-MCNC: 0.72 MG/DL — SIGNIFICANT CHANGE UP (ref 0.5–1.3)
DIFF PNL FLD: NEGATIVE — SIGNIFICANT CHANGE UP
EOSINOPHIL # BLD AUTO: 0 K/UL — SIGNIFICANT CHANGE UP (ref 0–0.5)
EOSINOPHIL NFR BLD AUTO: 0 % — SIGNIFICANT CHANGE UP (ref 0–6)
GLUCOSE SERPL-MCNC: 122 MG/DL — HIGH (ref 70–99)
GLUCOSE UR QL: NEGATIVE — SIGNIFICANT CHANGE UP
HCT VFR BLD CALC: 35.7 % — SIGNIFICANT CHANGE UP (ref 34.5–45)
HGB BLD-MCNC: 12.2 G/DL — SIGNIFICANT CHANGE UP (ref 11.5–15.5)
IANC: 6.95 K/UL — SIGNIFICANT CHANGE UP (ref 1.5–8.5)
IMM GRANULOCYTES NFR BLD AUTO: 0.4 % — SIGNIFICANT CHANGE UP (ref 0–1.5)
KETONES UR-MCNC: NEGATIVE — SIGNIFICANT CHANGE UP
LEUKOCYTE ESTERASE UR-ACNC: NEGATIVE — SIGNIFICANT CHANGE UP
LYMPHOCYTES # BLD AUTO: 0.4 K/UL — LOW (ref 1–3.3)
LYMPHOCYTES # BLD AUTO: 5.3 % — LOW (ref 13–44)
MAGNESIUM SERPL-MCNC: 1.8 MG/DL — SIGNIFICANT CHANGE UP (ref 1.6–2.6)
MCHC RBC-ENTMCNC: 28.5 PG — SIGNIFICANT CHANGE UP (ref 27–34)
MCHC RBC-ENTMCNC: 34.2 GM/DL — SIGNIFICANT CHANGE UP (ref 32–36)
MCV RBC AUTO: 83.4 FL — SIGNIFICANT CHANGE UP (ref 80–100)
MONOCYTES # BLD AUTO: 0.16 K/UL — SIGNIFICANT CHANGE UP (ref 0–0.9)
MONOCYTES NFR BLD AUTO: 2.1 % — SIGNIFICANT CHANGE UP (ref 2–14)
NEUTROPHILS # BLD AUTO: 6.95 K/UL — SIGNIFICANT CHANGE UP (ref 1.8–7.4)
NEUTROPHILS NFR BLD AUTO: 91.9 % — HIGH (ref 43–77)
NITRITE UR-MCNC: NEGATIVE — SIGNIFICANT CHANGE UP
NRBC # BLD: 0 /100 WBCS — SIGNIFICANT CHANGE UP
NRBC # FLD: 0 K/UL — SIGNIFICANT CHANGE UP
PH UR: 8.5 — HIGH (ref 5–8)
PLATELET # BLD AUTO: 191 K/UL — SIGNIFICANT CHANGE UP (ref 150–400)
POTASSIUM SERPL-MCNC: 3.7 MMOL/L — SIGNIFICANT CHANGE UP (ref 3.5–5.3)
POTASSIUM SERPL-SCNC: 3.7 MMOL/L — SIGNIFICANT CHANGE UP (ref 3.5–5.3)
PROT SERPL-MCNC: 7 G/DL — SIGNIFICANT CHANGE UP (ref 6–8.3)
PROT UR-MCNC: ABNORMAL
RBC # BLD: 4.28 M/UL — SIGNIFICANT CHANGE UP (ref 3.8–5.2)
RBC # FLD: 13.1 % — SIGNIFICANT CHANGE UP (ref 10.3–14.5)
SODIUM SERPL-SCNC: 134 MMOL/L — LOW (ref 135–145)
SP GR SPEC: 1.02 — SIGNIFICANT CHANGE UP (ref 1–1.05)
UROBILINOGEN FLD QL: SIGNIFICANT CHANGE UP
WBC # BLD: 7.56 K/UL — SIGNIFICANT CHANGE UP (ref 3.8–10.5)
WBC # FLD AUTO: 7.56 K/UL — SIGNIFICANT CHANGE UP (ref 3.8–10.5)

## 2021-10-08 PROCEDURE — 99284 EMERGENCY DEPT VISIT MOD MDM: CPT | Mod: 25

## 2021-10-08 PROCEDURE — 93010 ELECTROCARDIOGRAM REPORT: CPT

## 2021-10-08 RX ORDER — SODIUM CHLORIDE 9 MG/ML
1000 INJECTION INTRAMUSCULAR; INTRAVENOUS; SUBCUTANEOUS ONCE
Refills: 0 | Status: COMPLETED | OUTPATIENT
Start: 2021-10-08 | End: 2021-10-08

## 2021-10-08 RX ORDER — MECLIZINE HCL 12.5 MG
25 TABLET ORAL ONCE
Refills: 0 | Status: COMPLETED | OUTPATIENT
Start: 2021-10-08 | End: 2021-10-08

## 2021-10-08 RX ORDER — METOCLOPRAMIDE HCL 10 MG
10 TABLET ORAL ONCE
Refills: 0 | Status: COMPLETED | OUTPATIENT
Start: 2021-10-08 | End: 2021-10-08

## 2021-10-08 RX ORDER — MECLIZINE HCL 12.5 MG
1 TABLET ORAL
Qty: 21 | Refills: 0
Start: 2021-10-08

## 2021-10-08 RX ADMIN — SODIUM CHLORIDE 1000 MILLILITER(S): 9 INJECTION INTRAMUSCULAR; INTRAVENOUS; SUBCUTANEOUS at 12:32

## 2021-10-08 RX ADMIN — Medication 25 MILLIGRAM(S): at 12:32

## 2021-10-08 RX ADMIN — Medication 25 MILLIGRAM(S): at 13:45

## 2021-10-08 RX ADMIN — Medication 10 MILLIGRAM(S): at 13:44

## 2021-10-08 NOTE — ED PROVIDER NOTE - CLINICAL SUMMARY MEDICAL DECISION MAKING FREE TEXT BOX
40yo female h/o vertigo p/w more severe vertigo symptoms. Non-focal neuro exam, EKG non-ischemic. R/o UTI or metabolic abnormality. Meclizine, fluids, labs, UA, reassess.

## 2021-10-08 NOTE — ED PROVIDER NOTE - ATTENDING CONTRIBUTION TO CARE
Dr. Romero:  I have personally performed a face to face bedside history and physical examination of this patient. I have discussed the history, examination, review of systems, assessment and plan of management with the resident. I have reviewed the electronic medical record and amended it to reflect my history, review of systems, physical exam, assessment and plan.    41F h/o vertigo x 10 yrs (had extensive work up) presents with severe episode of vertigo with N/V.  Symptoms spontaneously improved prior to ED arrival.  ROS otherwise negative.    Exam:  - nad  - rrr  - ctab  - abd soft ntnd  - no focal neuro deficits, ambulating with stable gait    A/P  - likely exacerbation of vertigo, currently symptoms improved  - meds, labs, reassess

## 2021-10-08 NOTE — ED PROVIDER NOTE - NSFOLLOWUPINSTRUCTIONS_ED_ALL_ED_FT
- Continue all regular medications  - take meclizine as prescribed  - Follow up with neurology within 1-2 weeks, you will be helped by our discharge lounge  - You were given copies of labs and/or imaging results if applicable, please take them to your follow up appointments  - Return to the ER for change in vision, unable to walk, constant vomiting, numbness/weakness or any worsening symptoms or concerns

## 2021-10-08 NOTE — ED PROVIDER NOTE - PATIENT PORTAL LINK FT
You can access the FollowMyHealth Patient Portal offered by [facility] by registering at the following website: [email or web address]. By joining [facility]’s FollowMyHealth portal, you will also be able to view your health information using other applications (apps) compatible with our system.

## 2021-10-08 NOTE — ED ADULT NURSE NOTE - OBJECTIVE STATEMENT
Pt a&ox3 c/o dizziness starting this morning, pt has h/o vertigo, NSR on cardiac monitor, denies chest pain, no SOB, afebrile. IVL placed, labs collected and sent, medicated as ordered. Will continue to monitor.

## 2021-10-08 NOTE — ED PROVIDER NOTE - OBJECTIVE STATEMENT
40yo female vertigo p/w severe, brief episodes of vertigo worsened with head movement this AM. Had 1 episode vomiting. Symptoms since resolved. Vertigo started 10 years ago, had neurology and ENT workup as well as vestibular therapy. Denies AC use, head trauma, recent illness, new medications, difficulty walking, numbness, weakness, hearing loss.

## 2021-10-08 NOTE — ED ADULT TRIAGE NOTE - CHIEF COMPLAINT QUOTE
pt c/o dizziness with laying down, has history of vertigo, states this feels worse, has 3 episodes of nausea/ vomiting this am. symptoms started this am at 5am. Pt had thyroidectomy 5/2021, vertigo, anxiety

## 2021-10-08 NOTE — ED PROVIDER NOTE - PROGRESS NOTE DETAILS
Antonia PGY3: Patient reevaluated and feeling better. ambulatory. Reviewed and discussed results with patient. Discussed importance of follow up and return precautions. Patient agrees with plan.

## 2021-10-08 NOTE — ED PROVIDER NOTE - PHYSICAL EXAMINATION
Physical Exam:  Gen: NAD, AOx3, non-toxic appearing, able to ambulate without assistance  Head: NCAT  HEENT: EOMI, PEERLA, normal conjunctiva, tongue midline, oral mucosa moist  Lung: CTAB, no respiratory distress, no wheezes/rhonchi/rales B/L, speaking in full sentences  CV: RRR, no murmurs, rubs or gallops, distal pulses 2+ b/l  Abd: soft, NT, ND, no guarding, no rigidity, no rebound tenderness, no CVA tenderness   Neuro: CN II-XII intact, normal FTN, no nystagmus, normal gait,  5/5 strength b/l, sensation intact b/l  Skin: Warm, well perfused, no rash, no leg swelling  Psych: normal affect, calm

## 2021-10-10 LAB
CULTURE RESULTS: SIGNIFICANT CHANGE UP
SPECIMEN SOURCE: SIGNIFICANT CHANGE UP

## 2021-10-19 ENCOUNTER — LABORATORY RESULT (OUTPATIENT)
Age: 41
End: 2021-10-19

## 2021-10-19 ENCOUNTER — APPOINTMENT (OUTPATIENT)
Dept: SURGERY | Facility: CLINIC | Age: 41
End: 2021-10-19
Payer: MEDICAID

## 2021-10-19 PROCEDURE — 99214 OFFICE O/P EST MOD 30 MIN: CPT | Mod: 25

## 2021-10-19 RX ORDER — MECLIZINE HYDROCHLORIDE 25 MG/1
25 TABLET ORAL
Qty: 21 | Refills: 0 | Status: ACTIVE | COMMUNITY
Start: 2021-10-08

## 2021-10-19 NOTE — HISTORY OF PRESENT ILLNESS
[de-identified] : 5 months s/p total thyroidectomy and neck dissection for metastatic thyroid carcinoma .   feels fatigued on Synthroid 150 mcg daily. denies dysphagia, hoarseness or new lesions.  no changes medically since last visit. I have reviewed all old and new data and available images.

## 2021-10-19 NOTE — PROCEDURE
[None] : none [Mass ___ cm] : no masses on the base of the tongue [Lesion(s)] : no lesions [Normal] : normal

## 2021-10-19 NOTE — PHYSICAL EXAM
[de-identified] : well healed scar [de-identified] : no palpable thyroid nodules [Laryngoscopy Performed] : laryngoscopy was performed, see procedure section for findings [Midline] : located in midline position [Normal] : orientation to person, place, and time: normal

## 2021-10-19 NOTE — ASSESSMENT
[FreeTextEntry1] : will observe. bloods drawn. to call next week for results. sonogram next visit. to return earlier if any change.  patient has been given the opportunity to ask questions, and all of the patient's questions have been answered to their satisfaction\par

## 2021-10-21 LAB
T3 SERPL-MCNC: 75 NG/DL
T4 FREE SERPL-MCNC: 1.3 NG/DL
THYROGLOB AB SERPL-ACNC: 1084 IU/ML
THYROGLOB SERPL-MCNC: <0.2 NG/ML
TSH SERPL-ACNC: 3.36 UIU/ML

## 2021-10-25 ENCOUNTER — NON-APPOINTMENT (OUTPATIENT)
Age: 41
End: 2021-10-25

## 2021-11-30 NOTE — H&P PST ADULT - GENITOURINARY
Shower neck and below.  Leave shield over operative eye except to resume preoperative eye drops, and leave shield on until appointment today at [facility].     details…

## 2021-12-17 ENCOUNTER — APPOINTMENT (OUTPATIENT)
Dept: NEUROLOGY | Facility: CLINIC | Age: 41
End: 2021-12-17

## 2021-12-19 ENCOUNTER — LABORATORY RESULT (OUTPATIENT)
Age: 41
End: 2021-12-19

## 2021-12-30 ENCOUNTER — APPOINTMENT (OUTPATIENT)
Dept: SURGERY | Facility: CLINIC | Age: 41
End: 2021-12-30
Payer: MEDICAID

## 2021-12-30 PROCEDURE — 36415 COLL VENOUS BLD VENIPUNCTURE: CPT

## 2022-01-02 ENCOUNTER — NON-APPOINTMENT (OUTPATIENT)
Age: 42
End: 2022-01-02

## 2022-01-02 LAB
T3 SERPL-MCNC: 100 NG/DL
T4 FREE SERPL-MCNC: 2 NG/DL
THYROGLOB AB SERPL-ACNC: 905 IU/ML
THYROGLOB SERPL-MCNC: <0.2 NG/ML
TSH SERPL-ACNC: 0.1 UIU/ML

## 2022-01-04 ENCOUNTER — NON-APPOINTMENT (OUTPATIENT)
Age: 42
End: 2022-01-04

## 2022-02-02 NOTE — ASU PATIENT PROFILE, ADULT - MEDICATION ADMINISTRATION INFO, PROFILE
Patient monitored for 6d 18h starting on 01/17/2022 05:00 pm.  Primary rhythm was Sinus Rhythm. Average heart rate was 93 bpm, Minimum heart rate was 71 bpm on Day 7 / 07:06:12 am, Max heart rate was 133 bpm on Day 6 / 01:10:08 pm.  Atrial Fibrillation or Flutter: Ashton was 0 %, longest event 0 ms on --, fastest rate -- bpm on --.  PSVC(s): Ashton was 0.87 %, max count per 24 hours 1145.  SVT (AT, RT): 491 events, longest event 9min 07s on Day 6 / 12:02:23 pm, fastest event 150 bpm on Day 5 / 08:49:18 pm.  Pause: 0 events, longest pause 0 ms on --  AV Block: 0 %, most severe block demonstrated --  PVC(s): Ashton was 1.16 %, max count per 24 hours 1524, 2 disparate morphologies.  Ventricular Tachycardia: 18 events, longest event 6 beats at Day 6 / 04:58:42 pm, fastest rate 187 bpm at Day 6 / 04:58:42 pm.  Patient recorded 3 events during the monitoring period.   no concerns

## 2022-04-19 ENCOUNTER — LABORATORY RESULT (OUTPATIENT)
Age: 42
End: 2022-04-19

## 2022-04-19 ENCOUNTER — APPOINTMENT (OUTPATIENT)
Dept: SURGERY | Facility: CLINIC | Age: 42
End: 2022-04-19
Payer: MEDICAID

## 2022-04-19 PROCEDURE — 99214 OFFICE O/P EST MOD 30 MIN: CPT | Mod: 25

## 2022-04-19 NOTE — PHYSICAL EXAM
[de-identified] : well healed scar [de-identified] : no palpable thyroid nodules [Laryngoscopy Performed] : laryngoscopy was performed, see procedure section for findings [Midline] : located in midline position [Normal] : cranial nerves 2-12 intact

## 2022-04-19 NOTE — HISTORY OF PRESENT ILLNESS
[de-identified] : 1 year  s/p total thyroidectomy and right neck dissection for metastatic thyroid carcinoma .   feels well  on Synthroid 175 mcg daily. denies dysphagia, hoarseness or new lesions.  no changes medically since last visit. I have reviewed all old and new data and available images.  recent sonogram shows small indeterminate nodes right neck.  [facility]KEON completed 7/21

## 2022-04-19 NOTE — ASSESSMENT
[FreeTextEntry1] : will observe. bloods drawn. to call next week for results. f/u Thyrogen scan prior to  next visit. to return earlier if any change.  patient has been given the opportunity to ask questions, and all of the patient's questions have been answered to their satisfaction

## 2022-04-21 LAB
24R-OH-CALCIDIOL SERPL-MCNC: 55.3 PG/ML
CALCIUM SERPL-MCNC: 9.1 MG/DL
CALCIUM SERPL-MCNC: 9.1 MG/DL
PARATHYROID HORMONE INTACT: 42 PG/ML
T3 SERPL-MCNC: 94 NG/DL
T4 FREE SERPL-MCNC: 1.6 NG/DL
THYROGLOB AB SERPL-ACNC: 917 IU/ML
THYROGLOB SERPL-MCNC: <0.2 NG/ML
TSH SERPL-ACNC: 0.06 UIU/ML

## 2022-04-25 ENCOUNTER — NON-APPOINTMENT (OUTPATIENT)
Age: 42
End: 2022-04-25

## 2022-07-21 ENCOUNTER — RX RENEWAL (OUTPATIENT)
Age: 42
End: 2022-07-21

## 2022-08-01 ENCOUNTER — OUTPATIENT (OUTPATIENT)
Dept: OUTPATIENT SERVICES | Facility: HOSPITAL | Age: 42
LOS: 1 days | End: 2022-08-01
Payer: MEDICAID

## 2022-08-01 ENCOUNTER — APPOINTMENT (OUTPATIENT)
Dept: NUCLEAR MEDICINE | Facility: HOSPITAL | Age: 42
End: 2022-08-01

## 2022-08-01 DIAGNOSIS — C77.9 SECONDARY AND UNSPECIFIED MALIGNANT NEOPLASM OF LYMPH NODE, UNSPECIFIED: ICD-10-CM

## 2022-08-01 DIAGNOSIS — Z98.890 OTHER SPECIFIED POSTPROCEDURAL STATES: Chronic | ICD-10-CM

## 2022-08-01 LAB — HCG SERPL-ACNC: <2 MIU/ML — SIGNIFICANT CHANGE UP

## 2022-08-02 ENCOUNTER — APPOINTMENT (OUTPATIENT)
Dept: NUCLEAR MEDICINE | Facility: HOSPITAL | Age: 42
End: 2022-08-02

## 2022-08-03 ENCOUNTER — RESULT REVIEW (OUTPATIENT)
Age: 42
End: 2022-08-03

## 2022-08-03 ENCOUNTER — APPOINTMENT (OUTPATIENT)
Dept: NUCLEAR MEDICINE | Facility: HOSPITAL | Age: 42
End: 2022-08-03

## 2022-08-05 ENCOUNTER — RESULT REVIEW (OUTPATIENT)
Age: 42
End: 2022-08-05

## 2022-08-05 ENCOUNTER — APPOINTMENT (OUTPATIENT)
Dept: NUCLEAR MEDICINE | Facility: HOSPITAL | Age: 42
End: 2022-08-05

## 2022-08-05 PROCEDURE — 78020 THYROID MET UPTAKE: CPT | Mod: 26

## 2022-08-05 PROCEDURE — 96372 THER/PROPH/DIAG INJ SC/IM: CPT

## 2022-08-05 PROCEDURE — A9528: CPT

## 2022-08-05 PROCEDURE — 78020 THYROID MET UPTAKE: CPT

## 2022-08-05 PROCEDURE — 78018 THYROID MET IMAGING BODY: CPT | Mod: 26

## 2022-08-05 PROCEDURE — 36415 COLL VENOUS BLD VENIPUNCTURE: CPT

## 2022-08-05 PROCEDURE — 84702 CHORIONIC GONADOTROPIN TEST: CPT

## 2022-08-05 PROCEDURE — 99213 OFFICE O/P EST LOW 20 MIN: CPT

## 2022-08-05 PROCEDURE — 78018 THYROID MET IMAGING BODY: CPT

## 2022-08-10 ENCOUNTER — NON-APPOINTMENT (OUTPATIENT)
Age: 42
End: 2022-08-10

## 2022-08-14 NOTE — ED ADULT NURSE NOTE - ED CARDIAC HEART SOUNDS
Alert-The patient is alert, awake and responds to voice. The patient is oriented to time, place, and person. The triage nurse is able to obtain subjective information.
normal S1, S2 heard

## 2022-08-18 ENCOUNTER — NON-APPOINTMENT (OUTPATIENT)
Age: 42
End: 2022-08-18

## 2022-08-19 ENCOUNTER — OUTPATIENT (OUTPATIENT)
Dept: OUTPATIENT SERVICES | Facility: HOSPITAL | Age: 42
LOS: 1 days | End: 2022-08-19
Payer: MEDICAID

## 2022-08-19 ENCOUNTER — APPOINTMENT (OUTPATIENT)
Dept: CT IMAGING | Facility: IMAGING CENTER | Age: 42
End: 2022-08-19

## 2022-08-19 DIAGNOSIS — Z98.890 OTHER SPECIFIED POSTPROCEDURAL STATES: Chronic | ICD-10-CM

## 2022-08-19 DIAGNOSIS — Z00.8 ENCOUNTER FOR OTHER GENERAL EXAMINATION: ICD-10-CM

## 2022-08-19 PROCEDURE — 70491 CT SOFT TISSUE NECK W/DYE: CPT

## 2022-08-19 PROCEDURE — 70491 CT SOFT TISSUE NECK W/DYE: CPT | Mod: 26

## 2022-08-24 ENCOUNTER — NON-APPOINTMENT (OUTPATIENT)
Age: 42
End: 2022-08-24

## 2022-08-27 ENCOUNTER — APPOINTMENT (OUTPATIENT)
Dept: NUCLEAR MEDICINE | Facility: IMAGING CENTER | Age: 42
End: 2022-08-27

## 2022-09-01 NOTE — H&P PST ADULT - NSICDXFAMILYHX_GEN_ALL_CORE_FT
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 ([facility] )    Essential hypertension    Chronic combined systolic and diastolic congestive heart failure, NYHA class 4 ([facility])    Panlobular emphysema ([facility])    Gout    Low back pain with sciatica      * Rectal bleeding  Assessment & Plan  · Patient presented with bright red rectal bleeding since yesterday  · Suspecting a diverticulosis  · Patient has history of rectal bleeding 5 years ago was diagnosed as diverticulosis by CT abdomen  · Patient denied any pain with defecation  · Patient has diffuse abdominal pain and tenderness on examination  · Patient denies fever, his current temperature is 97 9 and white blood cell count is 11 13  · Current CT showed Colonic diverticulosis without findings of acute diverticulitis  · Fecal occult blood is positive    Assessment: Hemodynamically stable, Hgb response to transfusion of 1 unit on 8/31 and remains stable this morning  Diverticular bleed identified and treated with epi and clip by colonoscopy  Plan:  · Consult Gastroenterology, appreciate recommendations  · Colonoscopy 9/1/22:  Multiple large extensive diverticula containing blood in the transverse colon, descending colon and sigmoid colon  One diverticula with active oozing successfully treated with epi and clip, no active bleeding seen afterwards      · Repeat CBC 9/2 AM and will consider discharge with stable H&H  · PPI continue for 6 weeks post discharge  · Transfuse for Hgb < 8 given heart and renal transplant history  · S/p 1U PRBC 8/31 AM    CKD (chronic kidney disease) stage 3, GFR 30-59 ml/min [facility])  Assessment & Plan  Lab Results   Component Value Date    EGFR 42 09/01/2022    EGFR 42 08/31/2022    EGFR 40 08/30/2022    CREATININE 1 49 (H) 09/01/2022    CREATININE 1 49 (H) 08/31/2022    CREATININE 1 53 (H) 08/30/2022   · Patient has history of chronic kidney disease stage 3  · Patient current creatinine level is 1 53  · Current EGFR is 40  · Current potassium level is 4 4  · Continue to monitor patient creatinine level    Low back pain with sciatica  Assessment & Plan  · Patient has history of chronic low back pain with sciatica  · Continue acetaminophen 975 mg 3 times a day for pain  · PT/OT were consulted    Gout  Assessment & Plan  · Patient has history of gout  · Continue to monitor patient uric acid as outpatient  · Continue allopurinol 100 mg tablet in the morning and 200 mg tablet in the evening    Panlobular emphysema ([address] )  Assessment & Plan  · Patient has history of panlobular emphysema  · Last chest x-ray on 2020 showed hyperinflated lungs  · Patient is currently on room air the other requires oxygen  · Continue prednisone 2 5 mg tablet once daily  · Continue to monitor patient pulmonary function test as outpatient    Chronic combined systolic and diastolic congestive heart failure, NYHA class 4 ([facility])  Assessment & Plan  Wt Readings from Last 3 Encounters:   08/31/22 93 9 kg (207 lb 0 2 oz)   08/30/22 93 9 kg (207 lb)   08/29/22 97 1 kg (214 lb)     · Patient has history of chronic systolic and diastolic congestive heart failure  · Patient current weight is 93 9 kg  · Lost echocardiography on August 17, 2022 showed ejection fraction of 55%  · Continue to monitor patient weight daily  · Continue to monitor I's and O's daily        Essential hypertension  Assessment & Plan  · Patient has history of hypertension  ·  patient current blood pressure is 120/72  · Continue carvedilol 25 mg tablet twice daily  · Continue furosemide 40 mg tablet once daily  · Continue hydralazine 25 mg tablet 3 times daily  · Continue to monitor patient blood pressure; will hold antihypertensives in setting of hypotension     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 ([facility] )  Assessment & Plan  · Patient has history of coronary artery disease phone of the native arteries of transplanted heart  · Patient underwent stent placement  · Continue carvedilol 25 mg tablet twice daily  · Continue isosorbide mononitrate 120 mg 24 hours tablet as needed  · Continue nitroglycerin 0 4 mg sublingual tablet as needed    GERD (gastroesophageal reflux disease)  Assessment & Plan  · Patient has history of gastroesophageal reflux disease  · Continue pantoprazole EC tablet 40 mg in the early morning before breakfast    Hyperlipidemia  Assessment & Plan  · Patient has history of hyperlipidemia  · Continue atorvastatin 40 mg tablet once daily  · Continue to follow with lipid profile outpatient    History of heart transplant [facility])  Assessment & Plan  · Patient has history of renal transplant in 1997  · Continue mycophenolate EC tablet 180 mg twice daily  · Continue tacrolimus 1 mg tablet twice daily    Renal transplant, status post  Assessment & Plan  · Patient has history of renal transplant in 2007  · Continue mycophenolate EC tablet 180 mg twice daily  · Continue tacrolimus 1 mg tablet twice daily      [address]     Per initial H&P:  "86M with PMH of CKD3 (baseline Cr 1 5-1 8), renal and heart transplant on tacrolimus and mycophenolate, post-transplant CAD and HFpEF, HTN, HLD, GERD, BPH, emphysema, gout, anemia of chronic disease who presented to the ED per guidance of his PCP due to bright red blood per rectum   Of note, patient was seen in 4/2022 for possible GI bleed as he was complaining of hematochezia and black colored stools with associated abdominal pain  He does have a remote history of diverticulitis in the past  Hb was stable and CT in 4/2022 showed diverticulosis without diverticulitis, otherwise no evidence of acute abdominopelvic process  Patient stopped taking his ferrous sulfate and his symptoms improved       Patient now presenting with maroon-colored stools for about the last 24 hours  Last bowel movement last night was looser than normal without raul diarrhea or constipation  He noticed last night his stool was maroon with possible melenic component  Patient reports chronic RUQ/epigastric/LUQ abdominal pain for several years with no exacerbations of abdominal or chronic low back pain in the last 24-48 hours  He does also experience intermittent postprandial abdominal pain over the last few years without exacerbation in the last 24-48 hours  He further denies nausea/vomiting, hematemesis, recent changes in diet, chest pain, worsening dyspnea, lightheadedness, dizziness, headaches  His last colonoscopy was 6-8 years ago (per patient, not found in chart) which revealed diverticulitis  EGD at the same time was normal   Patient does still have sporadic dyspepsia (at most 2 times in 3 weeks) which is alleviated with PPI  He only takes his PPI PRN, not daily      In the ED, patient is afebrile and VSS in no acute distress  Labs notable for baseline kidney function, normal liver enzymes, WBC 11 13, Hb 9 0 (stable)  CT abdomen/pelvis shows colonic diverticulosis without findings of acute diverticulitis and nondilated loop of bowel protruding through a small ventral abdominal wall hernia (chronic finding)  "    Remainder of hospital course:  Patient transfused 1 unit PRBCs on 08/31 and repeat H&H showed appropriate response  GI was consulted and performed a colonoscopy on 09/01 which showed multiple diverticula, with 1 diverticula with active oozing bleeding that was successfully treated with epi and clipped    Repeat H&H following GI intervention slightly low, and patient received one unit pRBC  Hgb stayed stable at 8 8  Patient medically stable for discharge and was instructed to follow-up with GI and PCP on an outpatient basis  Plan to continue FeS 325 mg every other day  Review of Systems   Constitutional: Positive for fatigue  Negative for chills and fever  HENT: Negative for ear pain and sore throat  Eyes: Negative for pain and visual disturbance  Respiratory: Negative for cough and shortness of breath  Cardiovascular: Negative for chest pain and palpitations  Gastrointestinal: Negative for abdominal pain, anal bleeding, blood in stool and vomiting  Genitourinary: Negative for dysuria and hematuria  Musculoskeletal: Negative for arthralgias and back pain  Skin: Negative for color change and rash  Neurological: Negative for seizures and syncope  All other systems reviewed and are negative  Physical Exam  Constitutional:       Appearance: Normal appearance  HENT:      Head: Normocephalic and atraumatic  Right Ear: Tympanic membrane normal       Left Ear: Tympanic membrane normal       Mouth/Throat:      Mouth: Mucous membranes are moist    Eyes:      Pupils: Pupils are equal, round, and reactive to light  Cardiovascular:      Rate and Rhythm: Normal rate and regular rhythm  Heart sounds: No murmur heard  Pulmonary:      Effort: No respiratory distress  Breath sounds: No wheezing  Abdominal:      General: There is no distension  Tenderness: There is no abdominal tenderness  There is no guarding  Musculoskeletal:         General: Normal range of motion  Skin:     General: Skin is warm and dry  Capillary Refill: Capillary refill takes less than 2 seconds  Coloration: Skin is pale  Neurological:      Mental Status: He is alert and oriented to person, place, and time     Psychiatric:         Mood and Affect: Mood normal            DISCHARGE INFORMATION Vitals at Discharge:  Visit Vitals  /61   Pulse 89   Temp 97 5 °F (36 4 °C)   Resp 20   Ht 5' 6" (1 676 m)   Wt 93 9 kg (207 lb 0 2 oz)   SpO2 96%   BMI 33 41 kg/m²   Smoking Status Former Smoker   BSA 2 03 m²       PCP at Discharge: Lexis Gallo MD    Admitting Provider: Lexis Gallo MD  Admission Date: 8/30/2022    Discharge Provider: Lexis Gallo MD  Discharge Date: 9/3/2022    Discharge Disposition: Home with [address] [facility]  Discharge Condition: good  Discharge with Lines: no    Discharge Diet: regular diet  Activity Restrictions: none  Test Results Pending at Discharge:  None    Discharge Diagnoses:  Principal Problem:    Rectal bleeding  Active Problems:    CKD (chronic kidney disease) stage 3, GFR 30-59 ml/min ([facility])    Renal transplant, status post    History of heart transplant ([facility] )    Hyperlipidemia    GERD (gastroesophageal reflux disease)    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 ([place] [address] )    Essential hypertension    Chronic combined systolic and diastolic congestive heart failure, NYHA class 4 ([facility] )    Panlobular emphysema ([facility])    Gout    Low back pain with sciatica  Resolved Problems:    * No resolved hospital problems  *      Consulting Providers:  Gastroenterology    Diagnostic & Therapeutic Procedures Performed:  CT abdomen pelvis wo contrast    Result Date: 8/30/2022  Impression: Colonic diverticulosis without findings of acute diverticulitis  Chronic findings as described above   Workstation performed: [id number]       Code Status: Level 1 - Full Code  Advance Directive & Living Will: <no information>  Power of :    POLST:      Medications:  Current Discharge Medication List        Current Discharge Medication List        Current Discharge Medication List      CONTINUE these medications which have NOT CHANGED    Details   acetaminophen (TYLENOL) 325 mg tablet Take 3 tablets (975 mg total) by mouth every 8 (eight) hours  Qty: 90 tablet, Refills: 0    Associated Diagnoses: Acute pain of right knee      allopurinol (ZYLOPRIM) 100 mg tablet Take 200 mg by mouth 2 (two) times a day per patient taking 100mg in AM and 200mg PM       Aspirin 81 MG CAPS Take 81 mg by mouth in the morning   Indications: cardiac      atorvastatin (LIPITOR) 40 mg tablet Take 40 mg by mouth daily      Calcium Carbonate 1500 (600 Ca) MG TABS Take 600 mg by mouth daily       carvedilol (COREG) 25 mg tablet Take 1 tablet by mouth 2 (two) times a day      furosemide (LASIX) 20 mg tablet TAKE 2 TABLETS (40 MG TOTAL) BY MOUTH DAILY  Qty: 180 tablet, Refills: 3    Associated Diagnoses: Acute on chronic heart failure with preserved ejection fraction (HFpEF) ([facility])      hydrALAZINE (APRESOLINE) 25 mg tablet Take 1 tablet by mouth 3 (three) times a day      mirtazapine (REMERON) 7 5 MG tablet Take 1 tablet (7 5 mg total) by mouth daily at bedtime  Qty: 90 tablet, Refills: 0    Associated Diagnoses: Anxiety      mycophenolic acid (MYFORTIC) 883 mg EC tablet Take 180 mg by mouth 2 (two) times a day        omeprazole (PriLOSEC) 20 mg delayed release capsule Take 2 capsules (40 mg total) by mouth every evening  Qty: 30 capsule, Refills: 0    Associated Diagnoses: Rectal bleeding      predniSONE 2 5 mg tablet Take 2 5 mg by mouth daily      tacrolimus (PROGRAF) 1 mg capsule Take 1 mg by mouth daily 1g in am and 1g in pm       tamsulosin (FLOMAX) 0 4 mg Take 1 capsule (0 4 mg total) by mouth in the morning to take in evening  Qty: 90 capsule, Refills: 1    Associated Diagnoses: Benign prostatic hyperplasia with nocturia      zolpidem (AMBIEN) 10 mg tablet Take 10 mg by mouth daily at bedtime        benzonatate (TESSALON PERLES) 100 mg capsule Take 100 mg by mouth if needed for cough      Diclofenac Sodium (VOLTAREN) 1 % Apply 2 g topically as needed       isosorbide mononitrate (IMDUR) 120 mg 24 hr tablet Take 1 tablet (120 mg total) by mouth daily  Qty: 90 tablet, Refills: 3    Associated Diagnoses: 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 (HCC)      multivitamin (THERAGRAN) TABS Take 1 tablet by mouth daily  nitroglycerin (NITROSTAT) 0 4 mg SL tablet DISSOLVE 1 TABLET (0 4 MG TOTAL) UNDER THE TONGUE EVERY 5 (FIVE) MINUTES AS NEEDED FOR CHEST PAIN  Qty: 25 tablet, Refills: 4    Associated Diagnoses: Chest pain on breathing; 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 (HCC)      omega-3-acid ethyl esters (LOVAZA) 1 g capsule Take 1 g by mouth daily        polyethylene glycol (MIRALAX) 17 g packet Take 17 g by mouth daily as needed (Constipation)  Qty: 17 g, Refills: 0    Associated Diagnoses: Constipation      Polyvinyl Alcohol-Povidone (REFRESH OP) Apply to eye as needed      prednisoLONE acetate (PRED FORTE) 1 % ophthalmic suspension     Comments: not taking      senna-docusate sodium (SENOKOT S) 8 6-50 mg per tablet Take 1 tablet by mouth 2 (two) times a day as needed for constipation  Qty: 180 tablet, Refills: 0    Associated Diagnoses: Constipation             Allergies: Allergies   Allergen Reactions    Aspartame - Food Allergy Rash    Atenolol Other (See Comments)     Category: Allergy; Annotation - [id number]: all forms  Edema of skin    Category: Allergy; Annotation - [id number]: all forms  Edema of skin    Cyclosporine Diarrhea    Monosodium Glutamate - Food Allergy Rash    Morphine Other (See Comments) and Hallucinations     Hallucinations  Hallucinations    Penicillins Rash and Other (See Comments)     Category: Allergy; Annotation - [id number]: all forms  md cerda meropenem  Category: Allergy; Annotation - [id number]: all forms    Sucralose - Food Allergy Rash    Sulfa Antibiotics Rash       FOLLOW-UP     PCP Outpatient Follow-up:  Follow-up with PCP within 1 week of discharge      Consulting Providers Follow-up:  Follow-up with gastroenterology as instructed or within 2 weeks  Active Issues Requiring Follow-up:   Diverticular bleed    Discharge Statement:   I spent 30 minutes minutes discharging the patient  This time was spent on the day of discharge  I had direct contact with the patient on the day of discharge  Additional documentation is required if more than 30 minutes were spent on discharge  Portions of the record may have been created with voice recognition software  Occasional wrong word or "sound a like" substitutions may have occurred due to the inherent limitations of voice recognition software    Read the chart carefully and recognize, using context, where substitutions have occurred     ==  Alfonso Munoz, [facility]  Internal Medicine Resident PGY-1 FAMILY HISTORY:  No pertinent family history in first degree relatives

## 2022-09-12 ENCOUNTER — NON-APPOINTMENT (OUTPATIENT)
Age: 42
End: 2022-09-12

## 2022-09-23 ENCOUNTER — APPOINTMENT (OUTPATIENT)
Dept: NUCLEAR MEDICINE | Facility: CLINIC | Age: 42
End: 2022-09-23

## 2022-09-23 ENCOUNTER — OUTPATIENT (OUTPATIENT)
Dept: OUTPATIENT SERVICES | Facility: HOSPITAL | Age: 42
LOS: 1 days | End: 2022-09-23
Payer: MEDICAID

## 2022-09-23 DIAGNOSIS — C77.9 SECONDARY AND UNSPECIFIED MALIGNANT NEOPLASM OF LYMPH NODE, UNSPECIFIED: ICD-10-CM

## 2022-09-23 DIAGNOSIS — Z98.890 OTHER SPECIFIED POSTPROCEDURAL STATES: Chronic | ICD-10-CM

## 2022-09-23 PROCEDURE — A9552: CPT

## 2022-09-23 PROCEDURE — 78815 PET IMAGE W/CT SKULL-THIGH: CPT

## 2022-09-23 PROCEDURE — 78815 PET IMAGE W/CT SKULL-THIGH: CPT | Mod: 26,PI

## 2022-09-28 ENCOUNTER — NON-APPOINTMENT (OUTPATIENT)
Age: 42
End: 2022-09-28

## 2022-09-29 ENCOUNTER — RESULT REVIEW (OUTPATIENT)
Age: 42
End: 2022-09-29

## 2022-10-07 ENCOUNTER — OUTPATIENT (OUTPATIENT)
Dept: OUTPATIENT SERVICES | Facility: HOSPITAL | Age: 42
LOS: 1 days | End: 2022-10-07
Payer: MEDICAID

## 2022-10-07 ENCOUNTER — RESULT REVIEW (OUTPATIENT)
Age: 42
End: 2022-10-07

## 2022-10-07 ENCOUNTER — APPOINTMENT (OUTPATIENT)
Dept: ULTRASOUND IMAGING | Facility: IMAGING CENTER | Age: 42
End: 2022-10-07

## 2022-10-07 DIAGNOSIS — Z00.8 ENCOUNTER FOR OTHER GENERAL EXAMINATION: ICD-10-CM

## 2022-10-07 DIAGNOSIS — C77.0 SECONDARY AND UNSPECIFIED MALIGNANT NEOPLASM OF LYMPH NODES OF HEAD, FACE AND NECK: ICD-10-CM

## 2022-10-07 DIAGNOSIS — Z98.890 OTHER SPECIFIED POSTPROCEDURAL STATES: Chronic | ICD-10-CM

## 2022-10-07 PROCEDURE — 10005 FNA BX W/US GDN 1ST LES: CPT

## 2022-10-07 PROCEDURE — 88305 TISSUE EXAM BY PATHOLOGIST: CPT | Mod: 26

## 2022-10-07 PROCEDURE — 88305 TISSUE EXAM BY PATHOLOGIST: CPT

## 2022-10-07 PROCEDURE — 88173 CYTOPATH EVAL FNA REPORT: CPT

## 2022-10-07 PROCEDURE — 88173 CYTOPATH EVAL FNA REPORT: CPT | Mod: 26

## 2022-10-07 PROCEDURE — 88172 CYTP DX EVAL FNA 1ST EA SITE: CPT

## 2022-10-11 LAB — NON-GYNECOLOGICAL CYTOLOGY STUDY: SIGNIFICANT CHANGE UP

## 2022-10-14 ENCOUNTER — NON-APPOINTMENT (OUTPATIENT)
Age: 42
End: 2022-10-14

## 2022-10-25 ENCOUNTER — APPOINTMENT (OUTPATIENT)
Dept: SURGERY | Facility: CLINIC | Age: 42
End: 2022-10-25

## 2022-10-25 DIAGNOSIS — Z00.00 ENCOUNTER FOR GENERAL ADULT MEDICAL EXAMINATION W/OUT ABNORMAL FINDINGS: ICD-10-CM

## 2022-10-25 PROCEDURE — 36415 COLL VENOUS BLD VENIPUNCTURE: CPT

## 2022-10-25 PROCEDURE — 99214 OFFICE O/P EST MOD 30 MIN: CPT | Mod: 25

## 2022-10-25 RX ORDER — CYCLOBENZAPRINE HYDROCHLORIDE 10 MG/1
10 TABLET, FILM COATED ORAL
Qty: 21 | Refills: 0 | Status: ACTIVE | COMMUNITY
Start: 2022-06-24

## 2022-10-25 RX ORDER — FLUTICASONE PROPIONATE 50 UG/1
50 SPRAY, METERED NASAL
Qty: 16 | Refills: 0 | Status: ACTIVE | COMMUNITY
Start: 2022-07-27

## 2022-10-25 NOTE — PHYSICAL EXAM
[de-identified] : well healed scar [de-identified] : no palpable thyroid nodules [Laryngoscopy Performed] : laryngoscopy was performed, see procedure section for findings [Midline] : located in midline position [Normal] : orientation to person, place, and time: normal

## 2022-10-25 NOTE — ASSESSMENT
[FreeTextEntry1] : will observe. bloods drawn. to call next week for results. sonogram  next visit. to return earlier if any change.  patient has been given the opportunity to ask questions, and all of the patient's questions have been answered to their satisfaction.  discussed that surgery to remove these nodes is not without risk, with a high likelihood of persistent/recurrent disease following this and would only operate if nodes continue to enlarge.

## 2022-10-25 NOTE — HISTORY OF PRESENT ILLNESS
[de-identified] : 1 1/2 years  s/p total thyroidectomy and right neck dissection for metastatic thyroid carcinoma .   feels well  on Synthroid 175 mcg daily. denies dysphagia, hoarseness or new lesions.  no changes medically since last visit. I have reviewed all old and new data and available images.  recent sonogram shows small indeterminate nodes right neck.  [facility] completed 7/21. PET and CT and biopsy showed small positive paratracheal/lower jugular nodes

## 2022-10-26 ENCOUNTER — LABORATORY RESULT (OUTPATIENT)
Age: 42
End: 2022-10-26

## 2022-10-27 LAB
T3 SERPL-MCNC: 86 NG/DL
T4 FREE SERPL-MCNC: 2 NG/DL
THYROGLOB AB SERPL-ACNC: 1211 IU/ML
THYROGLOB SERPL-MCNC: <0.2 NG/ML
TSH SERPL-ACNC: 0.01 UIU/ML

## 2022-10-28 ENCOUNTER — NON-APPOINTMENT (OUTPATIENT)
Age: 42
End: 2022-10-28

## 2023-03-27 ENCOUNTER — NON-APPOINTMENT (OUTPATIENT)
Age: 43
End: 2023-03-27

## 2023-04-17 ENCOUNTER — OUTPATIENT (OUTPATIENT)
Dept: OUTPATIENT SERVICES | Facility: HOSPITAL | Age: 43
LOS: 1 days | End: 2023-04-17
Payer: MEDICAID

## 2023-04-17 ENCOUNTER — APPOINTMENT (OUTPATIENT)
Dept: ULTRASOUND IMAGING | Facility: CLINIC | Age: 43
End: 2023-04-17
Payer: MEDICAID

## 2023-04-17 DIAGNOSIS — Z98.890 OTHER SPECIFIED POSTPROCEDURAL STATES: Chronic | ICD-10-CM

## 2023-04-17 DIAGNOSIS — Z00.8 ENCOUNTER FOR OTHER GENERAL EXAMINATION: ICD-10-CM

## 2023-04-17 DIAGNOSIS — C77.9 SECONDARY AND UNSPECIFIED MALIGNANT NEOPLASM OF LYMPH NODE, UNSPECIFIED: ICD-10-CM

## 2023-04-17 PROCEDURE — 76536 US EXAM OF HEAD AND NECK: CPT | Mod: 26

## 2023-04-17 PROCEDURE — 76536 US EXAM OF HEAD AND NECK: CPT

## 2023-04-20 ENCOUNTER — RX RENEWAL (OUTPATIENT)
Age: 43
End: 2023-04-20

## 2023-04-25 ENCOUNTER — LABORATORY RESULT (OUTPATIENT)
Age: 43
End: 2023-04-25

## 2023-04-25 ENCOUNTER — APPOINTMENT (OUTPATIENT)
Dept: SURGERY | Facility: CLINIC | Age: 43
End: 2023-04-25
Payer: MEDICAID

## 2023-04-25 DIAGNOSIS — C77.0 SECONDARY AND UNSPECIFIED MALIGNANT NEOPLASM OF LYMPH NODES OF HEAD, FACE AND NECK: ICD-10-CM

## 2023-04-25 PROCEDURE — 99214 OFFICE O/P EST MOD 30 MIN: CPT | Mod: 25

## 2023-04-25 NOTE — PHYSICAL EXAM
[de-identified] : well healed scar [de-identified] : no palpable thyroid nodules [Laryngoscopy Performed] : laryngoscopy was performed, see procedure section for findings [Midline] : located in midline position [Normal] : orientation to person, place, and time: normal

## 2023-04-25 NOTE — HISTORY OF PRESENT ILLNESS
[de-identified] : 3 years  s/p total thyroidectomy and right neck dissection for metastatic thyroid carcinoma .   feels well  on Synthroid 175 mcg daily. denies dysphagia, hoarseness or new lesions.  no changes medically since last visit. I have reviewed all old and new data and available images.  recent sonogram shows  stable small nodes right neck.  [facility] completed 7/21. PET and CT and biopsy showed small positive paratracheal/lower jugular nodes

## 2023-04-27 LAB
T3 SERPL-MCNC: 85 NG/DL
T4 FREE SERPL-MCNC: 1.4 NG/DL
THYROGLOB AB SERPL-ACNC: 1254 IU/ML
THYROGLOB SERPL-MCNC: <0.2 NG/ML
TSH SERPL-ACNC: 0.06 UIU/ML

## 2023-05-02 ENCOUNTER — NON-APPOINTMENT (OUTPATIENT)
Age: 43
End: 2023-05-02

## 2023-07-17 NOTE — ED ADULT NURSE NOTE - CHPI ED NUR RELIEVING FX
No care due was identified.  [facility] Embedded Care Due Messages. Reference number: [id number].   7/17/2023 8:49:06 AM CDT  
Refill Decision Note      Refill Decision Note   Per Jacinto  is requesting a refill authorization.  Brief Assessment and Rationale for Refill:  Approve     Medication Therapy Plan:         Comments:     Note composed:8:55 AM 07/17/2023             Appointments     Last Visit   6/20/2023 Mitchell Laureano MD   Next Visit   7/17/2023 Mitchell Laureano MD      
none

## 2023-10-07 ENCOUNTER — APPOINTMENT (OUTPATIENT)
Dept: ULTRASOUND IMAGING | Facility: CLINIC | Age: 43
End: 2023-10-07
Payer: MEDICAID

## 2023-10-07 ENCOUNTER — OUTPATIENT (OUTPATIENT)
Dept: OUTPATIENT SERVICES | Facility: HOSPITAL | Age: 43
LOS: 1 days | End: 2023-10-07
Payer: MEDICAID

## 2023-10-07 DIAGNOSIS — Z98.890 OTHER SPECIFIED POSTPROCEDURAL STATES: Chronic | ICD-10-CM

## 2023-10-07 DIAGNOSIS — Z00.8 ENCOUNTER FOR OTHER GENERAL EXAMINATION: ICD-10-CM

## 2023-10-07 DIAGNOSIS — C77.9 SECONDARY AND UNSPECIFIED MALIGNANT NEOPLASM OF LYMPH NODE, UNSPECIFIED: ICD-10-CM

## 2023-10-07 PROCEDURE — 76536 US EXAM OF HEAD AND NECK: CPT | Mod: 26

## 2023-10-07 PROCEDURE — 76536 US EXAM OF HEAD AND NECK: CPT

## 2023-10-10 ENCOUNTER — APPOINTMENT (OUTPATIENT)
Dept: SURGERY | Facility: CLINIC | Age: 43
End: 2023-10-10
Payer: MEDICAID

## 2023-10-10 ENCOUNTER — LABORATORY RESULT (OUTPATIENT)
Age: 43
End: 2023-10-10

## 2023-10-10 DIAGNOSIS — C77.9 SECONDARY AND UNSPECIFIED MALIGNANT NEOPLASM OF LYMPH NODE, UNSPECIFIED: ICD-10-CM

## 2023-10-10 PROCEDURE — 99214 OFFICE O/P EST MOD 30 MIN: CPT | Mod: 25

## 2023-10-11 LAB
T3 SERPL-MCNC: 100 NG/DL
T4 FREE SERPL-MCNC: 1.5 NG/DL
TSH SERPL-ACNC: 0.25 UIU/ML

## 2023-12-27 NOTE — ED PROVIDER NOTE - RADIATION
Called Ann-Marie and informed of below information and recommendations below.       Appt scheduled    no radiation

## 2024-01-16 ENCOUNTER — RX RENEWAL (OUTPATIENT)
Age: 44
End: 2024-01-16

## 2024-01-16 RX ORDER — LEVOTHYROXINE SODIUM 0.17 MG/1
175 TABLET ORAL DAILY
Qty: 30 | Refills: 8 | Status: ACTIVE | COMMUNITY
Start: 2021-10-25 | End: 1900-01-01

## 2024-04-20 ENCOUNTER — OUTPATIENT (OUTPATIENT)
Dept: OUTPATIENT SERVICES | Facility: HOSPITAL | Age: 44
LOS: 1 days | End: 2024-04-20
Payer: MEDICAID

## 2024-04-20 ENCOUNTER — APPOINTMENT (OUTPATIENT)
Dept: ULTRASOUND IMAGING | Facility: CLINIC | Age: 44
End: 2024-04-20
Payer: MEDICAID

## 2024-04-20 DIAGNOSIS — Z98.890 OTHER SPECIFIED POSTPROCEDURAL STATES: Chronic | ICD-10-CM

## 2024-04-20 DIAGNOSIS — C77.9 SECONDARY AND UNSPECIFIED MALIGNANT NEOPLASM OF LYMPH NODE, UNSPECIFIED: ICD-10-CM

## 2024-04-20 PROCEDURE — 76536 US EXAM OF HEAD AND NECK: CPT

## 2024-04-20 PROCEDURE — 76536 US EXAM OF HEAD AND NECK: CPT | Mod: 26

## 2025-06-20 NOTE — ED PROVIDER NOTE - NSICDXNOFAMILYHX_GEN_ALL_ED
Patient arrives to ed in private vehicle for headache and dizziness secondary to a miscarriage of 6 weeks gestation. Patient states spontaneous  2 week ago and still lightly spotting off and on.   
<-- Click to add NO pertinent Family History

## 2025-07-15 NOTE — ED ADULT NURSE NOTE - DRUG PRE-SCREENING (DAST -1)
[FreeTextEntry1] :  72 year old female, never smoker, with a past medical history of HLD and a benign thyroid nodule. She was referred by Dr. Collins for evaluation of a recurrent cystic nodule within the subcutaneous tissues anterior to the left sternoclavicular joint.  She underwent an uncomplicated left upper chest cyst removal procedure on 2/7/23. She presents today for a follow up visit to assess her recovery and discuss results.\par \par Pathology 2/7/23: Left chest wall mass\par 1.  Soft tissue, left chest wall, excision:  Lymphangioma\par \par 
wine/liquor
Statement Selected